# Patient Record
Sex: MALE | Race: BLACK OR AFRICAN AMERICAN
[De-identification: names, ages, dates, MRNs, and addresses within clinical notes are randomized per-mention and may not be internally consistent; named-entity substitution may affect disease eponyms.]

---

## 2017-03-30 ENCOUNTER — HOSPITAL ENCOUNTER (OUTPATIENT)
Dept: HOSPITAL 35 - PAIN | Age: 67
End: 2017-03-30
Attending: ANESTHESIOLOGY
Payer: COMMERCIAL

## 2017-03-30 VITALS — SYSTOLIC BLOOD PRESSURE: 158 MMHG | DIASTOLIC BLOOD PRESSURE: 76 MMHG

## 2017-03-30 VITALS — BODY MASS INDEX: 40.97 KG/M2 | WEIGHT: 240 LBS | HEIGHT: 64.02 IN

## 2017-03-30 DIAGNOSIS — E66.01: ICD-10-CM

## 2017-03-30 DIAGNOSIS — F32.9: ICD-10-CM

## 2017-03-30 DIAGNOSIS — M54.12: Primary | ICD-10-CM

## 2017-03-30 DIAGNOSIS — F17.210: ICD-10-CM

## 2017-03-30 DIAGNOSIS — I10: ICD-10-CM

## 2017-10-06 ENCOUNTER — HOSPITAL ENCOUNTER (OUTPATIENT)
Dept: HOSPITAL 35 - PAIN | Age: 67
Discharge: HOME | End: 2017-10-06
Attending: ANESTHESIOLOGY
Payer: COMMERCIAL

## 2017-10-06 VITALS — BODY MASS INDEX: 38.41 KG/M2 | WEIGHT: 225 LBS | HEIGHT: 64.02 IN

## 2017-10-06 VITALS — DIASTOLIC BLOOD PRESSURE: 84 MMHG | SYSTOLIC BLOOD PRESSURE: 132 MMHG

## 2017-10-06 DIAGNOSIS — M54.16: Primary | ICD-10-CM

## 2017-10-06 DIAGNOSIS — M54.12: ICD-10-CM

## 2017-10-06 DIAGNOSIS — F17.200: ICD-10-CM

## 2017-10-27 ENCOUNTER — HOSPITAL ENCOUNTER (OUTPATIENT)
Dept: HOSPITAL 35 - PAIN | Age: 67
Discharge: HOME | End: 2017-10-27
Attending: ANESTHESIOLOGY
Payer: COMMERCIAL

## 2017-10-27 VITALS — WEIGHT: 225 LBS | HEIGHT: 64.02 IN | BODY MASS INDEX: 38.41 KG/M2

## 2017-10-27 VITALS — SYSTOLIC BLOOD PRESSURE: 143 MMHG | DIASTOLIC BLOOD PRESSURE: 88 MMHG

## 2017-10-27 DIAGNOSIS — Z79.891: ICD-10-CM

## 2017-10-27 DIAGNOSIS — M54.12: Primary | ICD-10-CM

## 2017-10-27 DIAGNOSIS — M54.16: ICD-10-CM

## 2017-12-13 ENCOUNTER — HOSPITAL ENCOUNTER (OUTPATIENT)
Dept: HOSPITAL 35 - PAIN | Age: 67
Discharge: HOME | End: 2017-12-13
Payer: COMMERCIAL

## 2017-12-13 VITALS — WEIGHT: 228 LBS | BODY MASS INDEX: 38.93 KG/M2 | HEIGHT: 64.02 IN

## 2017-12-13 VITALS — SYSTOLIC BLOOD PRESSURE: 123 MMHG | DIASTOLIC BLOOD PRESSURE: 73 MMHG

## 2017-12-13 DIAGNOSIS — F17.210: ICD-10-CM

## 2017-12-13 DIAGNOSIS — Z79.899: ICD-10-CM

## 2017-12-13 DIAGNOSIS — Z79.891: ICD-10-CM

## 2017-12-13 DIAGNOSIS — M54.16: Primary | ICD-10-CM

## 2017-12-13 DIAGNOSIS — Z98.890: ICD-10-CM

## 2017-12-13 DIAGNOSIS — M54.12: ICD-10-CM

## 2018-02-01 ENCOUNTER — HOSPITAL ENCOUNTER (OUTPATIENT)
Dept: HOSPITAL 35 - PAIN | Age: 68
End: 2018-02-01
Attending: ANESTHESIOLOGY
Payer: COMMERCIAL

## 2018-02-01 VITALS — WEIGHT: 231.8 LBS | HEIGHT: 64.02 IN | BODY MASS INDEX: 39.57 KG/M2

## 2018-02-01 VITALS — DIASTOLIC BLOOD PRESSURE: 85 MMHG | SYSTOLIC BLOOD PRESSURE: 127 MMHG

## 2018-02-01 DIAGNOSIS — Z79.899: ICD-10-CM

## 2018-02-01 DIAGNOSIS — M54.16: Primary | ICD-10-CM

## 2018-02-01 DIAGNOSIS — M54.12: ICD-10-CM

## 2018-03-16 ENCOUNTER — HOSPITAL ENCOUNTER (OUTPATIENT)
Dept: HOSPITAL 35 - PAIN | Age: 68
End: 2018-03-16
Payer: COMMERCIAL

## 2018-03-16 VITALS — SYSTOLIC BLOOD PRESSURE: 139 MMHG | DIASTOLIC BLOOD PRESSURE: 85 MMHG

## 2018-03-16 VITALS — WEIGHT: 234.2 LBS | HEIGHT: 64.02 IN | BODY MASS INDEX: 39.98 KG/M2

## 2018-03-16 DIAGNOSIS — I10: ICD-10-CM

## 2018-03-16 DIAGNOSIS — Z79.899: ICD-10-CM

## 2018-03-16 DIAGNOSIS — F17.210: ICD-10-CM

## 2018-03-16 DIAGNOSIS — G89.29: ICD-10-CM

## 2018-03-16 DIAGNOSIS — Z79.891: ICD-10-CM

## 2018-03-16 DIAGNOSIS — M54.16: Primary | ICD-10-CM

## 2018-03-16 DIAGNOSIS — M19.90: ICD-10-CM

## 2018-04-02 ENCOUNTER — HOSPITAL ENCOUNTER (OUTPATIENT)
Dept: HOSPITAL 35 - PAIN | Age: 68
End: 2018-04-02
Attending: ANESTHESIOLOGY
Payer: COMMERCIAL

## 2018-04-02 VITALS — HEIGHT: 64.02 IN | BODY MASS INDEX: 40.26 KG/M2 | WEIGHT: 235.8 LBS

## 2018-04-02 VITALS — DIASTOLIC BLOOD PRESSURE: 90 MMHG | SYSTOLIC BLOOD PRESSURE: 129 MMHG

## 2018-04-02 DIAGNOSIS — M54.12: ICD-10-CM

## 2018-04-02 DIAGNOSIS — G47.30: ICD-10-CM

## 2018-04-02 DIAGNOSIS — M54.16: Primary | ICD-10-CM

## 2018-06-11 ENCOUNTER — HOSPITAL ENCOUNTER (OUTPATIENT)
Dept: HOSPITAL 35 - PAIN | Age: 68
End: 2018-06-11
Attending: ANESTHESIOLOGY
Payer: COMMERCIAL

## 2018-06-11 VITALS — BODY MASS INDEX: 39.78 KG/M2 | WEIGHT: 233 LBS | HEIGHT: 64.02 IN

## 2018-06-11 VITALS — SYSTOLIC BLOOD PRESSURE: 152 MMHG | DIASTOLIC BLOOD PRESSURE: 85 MMHG

## 2018-06-11 DIAGNOSIS — F17.200: ICD-10-CM

## 2018-06-11 DIAGNOSIS — M54.5: ICD-10-CM

## 2018-06-11 DIAGNOSIS — Z79.899: ICD-10-CM

## 2018-06-11 DIAGNOSIS — F11.90: ICD-10-CM

## 2018-06-11 DIAGNOSIS — M54.2: ICD-10-CM

## 2018-06-11 DIAGNOSIS — G89.29: Primary | ICD-10-CM

## 2018-06-20 ENCOUNTER — HOSPITAL ENCOUNTER (OUTPATIENT)
Dept: HOSPITAL 35 - RAD | Age: 68
End: 2018-06-20
Attending: INTERNAL MEDICINE
Payer: COMMERCIAL

## 2018-06-20 DIAGNOSIS — J44.9: Primary | ICD-10-CM

## 2018-11-16 ENCOUNTER — HOSPITAL ENCOUNTER (OUTPATIENT)
Dept: HOSPITAL 35 - PAIN | Age: 68
Discharge: HOME | End: 2018-11-16
Payer: COMMERCIAL

## 2018-11-16 VITALS — BODY MASS INDEX: 43.77 KG/M2 | WEIGHT: 256.4 LBS | HEIGHT: 64.02 IN

## 2018-11-16 VITALS — SYSTOLIC BLOOD PRESSURE: 144 MMHG | DIASTOLIC BLOOD PRESSURE: 82 MMHG

## 2018-11-16 DIAGNOSIS — M54.5: Primary | ICD-10-CM

## 2018-11-16 DIAGNOSIS — Z79.899: ICD-10-CM

## 2018-11-16 DIAGNOSIS — F17.210: ICD-10-CM

## 2019-03-18 ENCOUNTER — HOSPITAL ENCOUNTER (INPATIENT)
Dept: HOSPITAL 35 - ER | Age: 69
LOS: 1 days | Discharge: HOME | DRG: 191 | End: 2019-03-19
Attending: INTERNAL MEDICINE | Admitting: INTERNAL MEDICINE
Payer: COMMERCIAL

## 2019-03-18 VITALS — SYSTOLIC BLOOD PRESSURE: 152 MMHG | DIASTOLIC BLOOD PRESSURE: 82 MMHG

## 2019-03-18 VITALS
DIASTOLIC BLOOD PRESSURE: 78 MMHG | SYSTOLIC BLOOD PRESSURE: 126 MMHG | BODY MASS INDEX: 40.82 KG/M2 | WEIGHT: 245 LBS | HEIGHT: 65 IN

## 2019-03-18 VITALS — DIASTOLIC BLOOD PRESSURE: 80 MMHG | SYSTOLIC BLOOD PRESSURE: 119 MMHG

## 2019-03-18 VITALS — SYSTOLIC BLOOD PRESSURE: 139 MMHG | DIASTOLIC BLOOD PRESSURE: 95 MMHG

## 2019-03-18 VITALS — DIASTOLIC BLOOD PRESSURE: 77 MMHG | SYSTOLIC BLOOD PRESSURE: 137 MMHG

## 2019-03-18 DIAGNOSIS — Z79.899: ICD-10-CM

## 2019-03-18 DIAGNOSIS — Z83.6: ICD-10-CM

## 2019-03-18 DIAGNOSIS — M06.9: ICD-10-CM

## 2019-03-18 DIAGNOSIS — J45.909: ICD-10-CM

## 2019-03-18 DIAGNOSIS — Z86.19: ICD-10-CM

## 2019-03-18 DIAGNOSIS — I10: ICD-10-CM

## 2019-03-18 DIAGNOSIS — J44.1: Primary | ICD-10-CM

## 2019-03-18 DIAGNOSIS — Z79.1: ICD-10-CM

## 2019-03-18 DIAGNOSIS — E66.01: ICD-10-CM

## 2019-03-18 DIAGNOSIS — Z90.49: ICD-10-CM

## 2019-03-18 DIAGNOSIS — M19.90: ICD-10-CM

## 2019-03-18 DIAGNOSIS — F17.210: ICD-10-CM

## 2019-03-18 DIAGNOSIS — G47.33: ICD-10-CM

## 2019-03-18 DIAGNOSIS — F12.90: ICD-10-CM

## 2019-03-18 LAB
ANION GAP SERPL CALC-SCNC: 10 MMOL/L (ref 7–16)
BASOPHILS NFR BLD AUTO: 3 % (ref 0–2)
BE(VIVO): -0.7 MMOL/L
BUN SERPL-MCNC: 13 MG/DL (ref 7–18)
CALCIUM SERPL-MCNC: 8.9 MG/DL (ref 8.5–10.1)
CHLORIDE SERPL-SCNC: 103 MMOL/L (ref 98–107)
CO2 SERPL-SCNC: 26 MMOL/L (ref 21–32)
CREAT SERPL-MCNC: 1.1 MG/DL (ref 0.7–1.3)
EOSINOPHIL NFR BLD: 2 % (ref 0–3)
ERYTHROCYTE [DISTWIDTH] IN BLOOD BY AUTOMATED COUNT: 13.1 % (ref 10.5–14.5)
GLUCOSE SERPL-MCNC: 103 MG/DL (ref 74–106)
GRANULOCYTES NFR BLD MANUAL: 53 % (ref 36–66)
HCO3 BLD-SCNC: 24.1 MMOL/L (ref 22–26)
HCT VFR BLD CALC: 41.4 % (ref 42–52)
HGB BLD-MCNC: 13.7 GM/DL (ref 14–18)
LYMPHOCYTES NFR BLD AUTO: 29 % (ref 24–44)
MCH RBC QN AUTO: 31.1 PG (ref 26–34)
MCHC RBC AUTO-ENTMCNC: 33 G/DL (ref 28–37)
MCV RBC: 94.3 FL (ref 80–100)
MONOCYTES NFR BLD: 13 % (ref 1–8)
NEUTROPHILS # BLD: 2.2 THOU/UL (ref 1.4–8.2)
PCO2 BLD: 40.1 MMHG (ref 35–45)
PLATELET # BLD: 152 THOU/UL (ref 150–400)
PO2 BLD: 67.9 MMHG (ref 80–100)
POTASSIUM SERPL-SCNC: 3.9 MMOL/L (ref 3.5–5.1)
RBC # BLD AUTO: 4.39 MIL/UL (ref 4.5–6)
RBC MORPH BLD: NORMAL
SODIUM SERPL-SCNC: 139 MMOL/L (ref 136–145)
TROPONIN I SERPL-MCNC: 0.07 NG/ML (ref ?–0.06)
WBC # BLD AUTO: 4.1 THOU/UL (ref 4–11)

## 2019-03-18 PROCEDURE — 10081 I&D PILONIDAL CYST COMP: CPT

## 2019-03-18 NOTE — NUR
PT ORIENTED TO ROOM AND UNIT. BED LOW AND LOCKED, SIDE RAILS UPX 3, CALL LIGHT
IN REACH. WILL CONTINUE TO ASSESS.

## 2019-03-19 VITALS — SYSTOLIC BLOOD PRESSURE: 148 MMHG | DIASTOLIC BLOOD PRESSURE: 92 MMHG

## 2019-03-19 VITALS — DIASTOLIC BLOOD PRESSURE: 92 MMHG | SYSTOLIC BLOOD PRESSURE: 148 MMHG

## 2019-03-19 VITALS — SYSTOLIC BLOOD PRESSURE: 136 MMHG | DIASTOLIC BLOOD PRESSURE: 74 MMHG

## 2019-03-19 LAB
ANION GAP SERPL CALC-SCNC: 10 MMOL/L (ref 7–16)
BUN SERPL-MCNC: 14 MG/DL (ref 7–18)
CALCIUM SERPL-MCNC: 8.7 MG/DL (ref 8.5–10.1)
CHLORIDE SERPL-SCNC: 102 MMOL/L (ref 98–107)
CHOLEST SERPL-MCNC: 124 MG/DL (ref ?–200)
CO2 SERPL-SCNC: 25 MMOL/L (ref 21–32)
CREAT SERPL-MCNC: 1.1 MG/DL (ref 0.7–1.3)
ERYTHROCYTE [DISTWIDTH] IN BLOOD BY AUTOMATED COUNT: 13.3 % (ref 10.5–14.5)
GLUCOSE SERPL-MCNC: 166 MG/DL (ref 74–106)
HCT VFR BLD CALC: 42.6 % (ref 42–52)
HDLC SERPL-MCNC: 67 MG/DL (ref 40–?)
HGB BLD-MCNC: 13.9 GM/DL (ref 14–18)
LDLC SERPL-MCNC: 50 MG/DL (ref ?–100)
MCH RBC QN AUTO: 30.8 PG (ref 26–34)
MCHC RBC AUTO-ENTMCNC: 32.7 G/DL (ref 28–37)
MCV RBC: 94.2 FL (ref 80–100)
PLATELET # BLD: 165 THOU/UL (ref 150–400)
POTASSIUM SERPL-SCNC: 4.4 MMOL/L (ref 3.5–5.1)
RBC # BLD AUTO: 4.52 MIL/UL (ref 4.5–6)
SODIUM SERPL-SCNC: 137 MMOL/L (ref 136–145)
TC:HDL: 1.9 RATIO
TRIGL SERPL-MCNC: 37 MG/DL (ref ?–150)
TROPONIN I SERPL-MCNC: 0.06 NG/ML (ref ?–0.06)
VLDLC SERPL CALC-MCNC: 7 MG/DL (ref ?–40)
WBC # BLD AUTO: 4.7 THOU/UL (ref 4–11)

## 2019-03-19 PROCEDURE — 5A09357 ASSISTANCE WITH RESPIRATORY VENTILATION, LESS THAN 24 CONSECUTIVE HOURS, CONTINUOUS POSITIVE AIRWAY PRESSURE: ICD-10-PCS | Performed by: INTERNAL MEDICINE

## 2019-03-19 NOTE — NUR
ASSUMED PT CARE AT 1900 WITH NO SIGN OF DISTRESS NOTED IN PT. PT IS ALERT AND
ORIENTED AND IS ON 2L NC. PT HAS CPAP FOR BEDTIME. SCHEDULED MEDS ADMINISTERED
TO PT. PRN BREATHING TREATMENT ADMINISTERED AS NEEDED. VITAL SIGNS STABLE. NO
SIGN OF DISTRESS NOTED, DENIES ANY FURTHER NEEDS AT THIS TIME.

## 2019-03-29 ENCOUNTER — HOSPITAL ENCOUNTER (OUTPATIENT)
Dept: HOSPITAL 35 - PAIN | Age: 69
Discharge: HOME | End: 2019-03-29
Payer: COMMERCIAL

## 2019-03-29 VITALS — BODY MASS INDEX: 43.02 KG/M2 | HEIGHT: 64.02 IN | WEIGHT: 252 LBS

## 2019-03-29 VITALS — DIASTOLIC BLOOD PRESSURE: 88 MMHG | SYSTOLIC BLOOD PRESSURE: 151 MMHG

## 2019-03-29 DIAGNOSIS — J44.9: ICD-10-CM

## 2019-03-29 DIAGNOSIS — I10: ICD-10-CM

## 2019-03-29 DIAGNOSIS — Z86.19: ICD-10-CM

## 2019-03-29 DIAGNOSIS — Z88.8: ICD-10-CM

## 2019-03-29 DIAGNOSIS — Z79.891: ICD-10-CM

## 2019-03-29 DIAGNOSIS — G89.29: ICD-10-CM

## 2019-03-29 DIAGNOSIS — K21.9: ICD-10-CM

## 2019-03-29 DIAGNOSIS — M54.12: ICD-10-CM

## 2019-03-29 DIAGNOSIS — Z79.899: ICD-10-CM

## 2019-03-29 DIAGNOSIS — Z98.890: ICD-10-CM

## 2019-03-29 DIAGNOSIS — M54.16: Primary | ICD-10-CM

## 2019-03-29 NOTE — NUR
Pain Clinic Assessment:
 
1. History of Osteoarthritis:
NO
   History of Rheumatoid Arthritis:
NO
 
2. Height: 5 ft. 4 in. 162.6 cm.
   Weight: 252.0 lb.  oz. 114.307 kg.
   Patient's BMI: 43.2
 
3. Vital Signs:
   BP: 151/88 Pulse: 103 Resp: 18
   Temp:  02 Sat: 96 ECG Mon:
 
4. Pain Intensity: 6
 
5. Fall Risk:
   Dizziness: N  Needs help standing or walking: N
   Fallen in the last 3 months: N
   Fall risk comments:
FELL 2 MONTHS AGO  DID NOT SEEK MEDICAL
 
6. Patient on Blood Thinner: None
 
7. History of Hypertension: Y
 
8. Opioid Therapy greater than 6 weeks: Y
   Opiate Contract Signed: 10/27/17
 
9. Risk Assessment Tool Provided: LOW-0
 
10. Functional Assessment Tool: 38/70
 
11. Recreational Drug Use: Never Drug Type:
    Tobacco Use: Never Smoker Tobacco Type:
       Amount or Packs/day:  How Many Years:
    Alcohol Use: Yes  Frequency:  Quant:

## 2019-04-24 ENCOUNTER — HOSPITAL ENCOUNTER (OUTPATIENT)
Dept: HOSPITAL 35 - PAIN | Age: 69
End: 2019-04-24
Payer: COMMERCIAL

## 2019-04-24 ENCOUNTER — HOSPITAL ENCOUNTER (EMERGENCY)
Dept: HOSPITAL 35 - ER | Age: 69
Discharge: HOME | End: 2019-04-24
Payer: COMMERCIAL

## 2019-04-24 ENCOUNTER — HOSPITAL ENCOUNTER (OUTPATIENT)
Dept: HOSPITAL 35 - RAD | Age: 69
End: 2019-04-24
Attending: INTERNAL MEDICINE
Payer: COMMERCIAL

## 2019-04-24 VITALS — SYSTOLIC BLOOD PRESSURE: 144 MMHG | DIASTOLIC BLOOD PRESSURE: 82 MMHG

## 2019-04-24 VITALS — WEIGHT: 241.01 LBS | BODY MASS INDEX: 40.15 KG/M2 | HEIGHT: 65 IN

## 2019-04-24 VITALS — SYSTOLIC BLOOD PRESSURE: 149 MMHG | DIASTOLIC BLOOD PRESSURE: 67 MMHG

## 2019-04-24 VITALS — SYSTOLIC BLOOD PRESSURE: 117 MMHG | DIASTOLIC BLOOD PRESSURE: 99 MMHG

## 2019-04-24 VITALS — BODY MASS INDEX: 41.11 KG/M2 | WEIGHT: 240.8 LBS | HEIGHT: 64.02 IN

## 2019-04-24 DIAGNOSIS — I10: ICD-10-CM

## 2019-04-24 DIAGNOSIS — M54.5: ICD-10-CM

## 2019-04-24 DIAGNOSIS — R06.02: Primary | ICD-10-CM

## 2019-04-24 DIAGNOSIS — J44.9: ICD-10-CM

## 2019-04-24 DIAGNOSIS — G89.29: Primary | ICD-10-CM

## 2019-04-24 DIAGNOSIS — Z86.19: ICD-10-CM

## 2019-04-24 DIAGNOSIS — K21.0: Primary | ICD-10-CM

## 2019-04-24 DIAGNOSIS — Z88.8: ICD-10-CM

## 2019-04-24 DIAGNOSIS — F17.210: ICD-10-CM

## 2019-04-24 DIAGNOSIS — Z88.6: ICD-10-CM

## 2019-04-24 DIAGNOSIS — Z79.891: ICD-10-CM

## 2019-04-24 DIAGNOSIS — M25.552: ICD-10-CM

## 2019-04-24 LAB
ALBUMIN SERPL-MCNC: 3.8 G/DL (ref 3.4–5)
ALT SERPL-CCNC: 54 U/L (ref 30–65)
ANION GAP SERPL CALC-SCNC: 8 MMOL/L (ref 7–16)
AST SERPL-CCNC: 35 U/L (ref 15–37)
BASOPHILS NFR BLD AUTO: 0 % (ref 0–2)
BILIRUB SERPL-MCNC: 0.8 MG/DL
BUN SERPL-MCNC: 7 MG/DL (ref 7–18)
CALCIUM SERPL-MCNC: 9.5 MG/DL (ref 8.5–10.1)
CHLORIDE SERPL-SCNC: 102 MMOL/L (ref 98–107)
CO2 SERPL-SCNC: 28 MMOL/L (ref 21–32)
CREAT SERPL-MCNC: 1 MG/DL (ref 0.7–1.3)
EOSINOPHIL NFR BLD: 0 % (ref 0–3)
ERYTHROCYTE [DISTWIDTH] IN BLOOD BY AUTOMATED COUNT: 14.3 % (ref 10.5–14.5)
GLUCOSE SERPL-MCNC: 95 MG/DL (ref 74–106)
GRANULOCYTES NFR BLD MANUAL: 68 % (ref 36–66)
HCT VFR BLD CALC: 43.9 % (ref 42–52)
HGB BLD-MCNC: 14.4 GM/DL (ref 14–18)
LYMPHOCYTES NFR BLD AUTO: 21 % (ref 24–44)
MAGNESIUM SERPL-MCNC: 1.9 MG/DL (ref 1.8–2.4)
MCH RBC QN AUTO: 31.6 PG (ref 26–34)
MCHC RBC AUTO-ENTMCNC: 32.9 G/DL (ref 28–37)
MCV RBC: 96.2 FL (ref 80–100)
MONOCYTES NFR BLD: 11 % (ref 1–8)
NEUTROPHILS # BLD: 2.9 THOU/UL (ref 1.4–8.2)
PLATELET # BLD EST: NORMAL 10*3/UL
PLATELET # BLD: 150 THOU/UL (ref 150–400)
POTASSIUM SERPL-SCNC: 3.9 MMOL/L (ref 3.5–5.1)
PROT SERPL-MCNC: 7.5 G/DL (ref 6.4–8.2)
RBC # BLD AUTO: 4.57 MIL/UL (ref 4.5–6)
RBC MORPH BLD: NORMAL
SODIUM SERPL-SCNC: 138 MMOL/L (ref 136–145)
TROPONIN I SERPL-MCNC: <0.06 NG/ML (ref ?–0.06)
WBC # BLD AUTO: 4.2 THOU/UL (ref 4–11)

## 2019-04-24 NOTE — NUR
Pain Clinic Assessment:
 
1. History of Osteoarthritis:
NO
   History of Rheumatoid Arthritis:
NO
 
2. Height: 5 ft. 4 in. 162.6 cm.
   Weight: 240.8 lb.  oz. 109.226 kg.
   Patient's BMI: 41.3
 
3. Vital Signs:
   BP: 144/82 Pulse: 51 Resp: 16
   Temp:  02 Sat: 100 ECG Mon:
 
4. Pain Intensity: 6
 
5. Fall Risk:
   Dizziness: Y  Needs help standing or walking: N
   Fallen in the last 3 months: N
   Fall risk comments:
FELL 2 MONTHS AGO  DID NOT SEEK MEDICAL
 
6. Patient on Blood Thinner: None
 
7. History of Hypertension: Y
 
8. Opioid Therapy greater than 6 weeks: Y
   Opiate Contract Signed: 10/27/17
 
9. Risk Assessment Tool Provided: LOW-0
 
10. Functional Assessment Tool: 38/70
 
11. Recreational Drug Use: Never Drug Type:
    Tobacco Use: Current Every Day Smoker Tobacco Type: Cigarettes
       Amount or Packs/day: 9-11 CIGS How Many Years:
    Alcohol Use: Yes  Frequency: Daily Quant: 2

## 2019-05-01 ENCOUNTER — HOSPITAL ENCOUNTER (OUTPATIENT)
Dept: HOSPITAL 35 - PAIN | Age: 69
Discharge: HOME | End: 2019-05-01
Payer: COMMERCIAL

## 2019-05-01 VITALS — SYSTOLIC BLOOD PRESSURE: 144 MMHG | DIASTOLIC BLOOD PRESSURE: 91 MMHG

## 2019-05-01 VITALS — BODY MASS INDEX: 40.12 KG/M2 | WEIGHT: 240.8 LBS | HEIGHT: 65 IN

## 2019-05-01 DIAGNOSIS — Z79.899: ICD-10-CM

## 2019-05-01 DIAGNOSIS — J44.9: ICD-10-CM

## 2019-05-01 DIAGNOSIS — M54.16: Primary | ICD-10-CM

## 2019-05-01 DIAGNOSIS — M54.12: ICD-10-CM

## 2019-05-01 DIAGNOSIS — I10: ICD-10-CM

## 2019-05-01 DIAGNOSIS — K21.0: ICD-10-CM

## 2019-05-01 DIAGNOSIS — Z86.19: ICD-10-CM

## 2019-05-01 DIAGNOSIS — F17.210: ICD-10-CM

## 2019-05-01 DIAGNOSIS — G89.29: ICD-10-CM

## 2019-05-01 DIAGNOSIS — Z98.890: ICD-10-CM

## 2019-05-01 DIAGNOSIS — Z88.6: ICD-10-CM

## 2019-05-01 NOTE — NUR
Pain Clinic Assessment:
 
1. History of Osteoarthritis:
NO
   History of Rheumatoid Arthritis:
NO
 
2. Height: 5 ft. 5 in. 165.1 cm.
   Weight: 240.8 lb.  oz. 109.226 kg.
   Patient's BMI: 40.1
 
3. Vital Signs:
   BP: 144/91 Pulse: 90 Resp: 18
   Temp:  02 Sat: 97 ECG Mon:
 
4. Pain Intensity: 4-5
 
5. Fall Risk:
   Dizziness: N  Needs help standing or walking: N
   Fallen in the last 3 months: N
   Fall risk comments:
FELL 2 MONTHS AGO  DID NOT SEEK MEDICAL
 
6. Patient on Blood Thinner: None
 
7. History of Hypertension: Y
 
8. Opioid Therapy greater than 6 weeks: Y
   Opiate Contract Signed: 10/27/17
 
9. Risk Assessment Tool Provided: LOW-0
 
10. Functional Assessment Tool: 38/70
 
11. Recreational Drug Use: Current within past 3 mos Drug Type: MARIJUANA
    Tobacco Use: Current Every Day Smoker Tobacco Type: Cigarettes
       Amount or Packs/day: 10 CIGS How Many Years: 55
    Alcohol Use: Yes  Frequency: Daily Quant: 1-2

## 2019-06-13 ENCOUNTER — HOSPITAL ENCOUNTER (OUTPATIENT)
Dept: HOSPITAL 35 - RAD | Age: 69
End: 2019-06-13
Attending: INTERNAL MEDICINE
Payer: COMMERCIAL

## 2019-06-13 DIAGNOSIS — J98.4: Primary | ICD-10-CM

## 2019-07-24 ENCOUNTER — HOSPITAL ENCOUNTER (OUTPATIENT)
Dept: HOSPITAL 35 - PAIN | Age: 69
Discharge: HOME | End: 2019-07-24
Payer: COMMERCIAL

## 2019-07-24 VITALS — BODY MASS INDEX: 39.98 KG/M2 | WEIGHT: 234.2 LBS | HEIGHT: 64.02 IN

## 2019-07-24 VITALS — SYSTOLIC BLOOD PRESSURE: 152 MMHG | DIASTOLIC BLOOD PRESSURE: 92 MMHG

## 2019-07-24 DIAGNOSIS — Z98.890: ICD-10-CM

## 2019-07-24 DIAGNOSIS — Z79.899: ICD-10-CM

## 2019-07-24 DIAGNOSIS — M54.12: ICD-10-CM

## 2019-07-24 DIAGNOSIS — F17.210: ICD-10-CM

## 2019-07-24 DIAGNOSIS — I10: ICD-10-CM

## 2019-07-24 DIAGNOSIS — M54.16: Primary | ICD-10-CM

## 2019-07-24 DIAGNOSIS — J44.9: ICD-10-CM

## 2019-07-24 DIAGNOSIS — Z88.8: ICD-10-CM

## 2019-07-24 DIAGNOSIS — R42: ICD-10-CM

## 2019-07-24 NOTE — NUR
Pain Clinic Assessment:
 
1. History of Osteoarthritis:
NO
   History of Rheumatoid Arthritis:
NO
 
2. Height: 5 ft. 4 in. 162.6 cm.
   Weight: 234.2 lb.  oz. 106.233 kg.
   Patient's BMI: 40.2
 
3. Vital Signs:
   BP: 152/92 Pulse: 52 Resp: 16
   Temp:  02 Sat: 98 ECG Mon:
 
4. Pain Intensity: 7-8
 
5. Fall Risk:
   Dizziness: N  Needs help standing or walking: N
   Fallen in the last 3 months: N
   Fall risk comments:
FELL 2 MONTHS AGO  DID NOT SEEK MEDICAL
 
6. Patient on Blood Thinner: None
 
7. History of Hypertension: Y
 
8. Opioid Therapy greater than 6 weeks: Y
   Opiate Contract Signed: 10/27/17
 
9. Risk Assessment Tool Provided: LOW-0
 
10. Functional Assessment Tool: 38/70
 
11. Recreational Drug Use: Never Drug Type:
    Tobacco Use: Current Every Day Smoker Tobacco Type: Cigarettes
       Amount or Packs/day: 1 PACK How Many Years:
    Alcohol Use: Yes  Frequency: Daily Quant: 2

## 2019-09-19 ENCOUNTER — HOSPITAL ENCOUNTER (OUTPATIENT)
Dept: HOSPITAL 35 - RAD | Age: 69
End: 2019-09-19
Attending: INTERNAL MEDICINE
Payer: COMMERCIAL

## 2019-09-19 DIAGNOSIS — J44.1: Primary | ICD-10-CM

## 2019-09-19 DIAGNOSIS — Z88.8: ICD-10-CM

## 2019-09-19 DIAGNOSIS — J98.4: ICD-10-CM

## 2019-10-02 ENCOUNTER — HOSPITAL ENCOUNTER (OUTPATIENT)
Dept: HOSPITAL 35 - PAIN | Age: 69
Discharge: HOME | End: 2019-10-02
Payer: COMMERCIAL

## 2019-10-02 VITALS — BODY MASS INDEX: 38.59 KG/M2 | WEIGHT: 231.6 LBS | HEIGHT: 65 IN

## 2019-10-02 VITALS — SYSTOLIC BLOOD PRESSURE: 149 MMHG | DIASTOLIC BLOOD PRESSURE: 91 MMHG

## 2019-10-02 DIAGNOSIS — Z79.899: ICD-10-CM

## 2019-10-02 DIAGNOSIS — I10: ICD-10-CM

## 2019-10-02 DIAGNOSIS — M54.12: ICD-10-CM

## 2019-10-02 DIAGNOSIS — M54.16: Primary | ICD-10-CM

## 2019-10-02 DIAGNOSIS — J44.9: ICD-10-CM

## 2019-10-02 DIAGNOSIS — F17.210: ICD-10-CM

## 2019-10-02 DIAGNOSIS — Z88.8: ICD-10-CM

## 2019-10-02 NOTE — NUR
Pain Clinic Assessment:
 
1. History of Osteoarthritis:
NO
   History of Rheumatoid Arthritis:
NO
 
2. Height: 5 ft. 5 in. 165.1 cm.
   Weight: 231.6 lb.  oz. 105.053 kg.
   Patient's BMI: 38.5
 
3. Vital Signs:
   BP: 149/91 Pulse: 95 Resp: 20
   Temp:  02 Sat: 98 ECG Mon:
 
4. Pain Intensity: 7-8
 
5. Fall Risk:
   Dizziness: N  Needs help standing or walking: N
   Fallen in the last 3 months: N
   Fall risk comments:
FELL 2 MONTHS AGO  DID NOT SEEK MEDICAL
 
6. Patient on Blood Thinner: None
 
7. History of Hypertension: Y
 
8. Opioid Therapy greater than 6 weeks: Y
   Opiate Contract Signed: 10/27/17
 
9. Risk Assessment Tool Provided: LOW-0
 
10. Functional Assessment Tool: 38/70
 
11. Recreational Drug Use: Never Drug Type:
    Tobacco Use: Current Every Day Smoker Tobacco Type:
       Amount or Packs/day:  How Many Years:
    Alcohol Use: Yes  Frequency:  Quant:
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
## 2019-10-25 ENCOUNTER — HOSPITAL ENCOUNTER (OUTPATIENT)
Dept: HOSPITAL 35 - PAIN | Age: 69
End: 2019-10-25
Payer: COMMERCIAL

## 2019-10-25 ENCOUNTER — HOSPITAL ENCOUNTER (EMERGENCY)
Dept: HOSPITAL 35 - ER | Age: 69
Discharge: LEFT BEFORE DISCHARGE | End: 2019-10-25
Payer: COMMERCIAL

## 2019-10-25 VITALS — SYSTOLIC BLOOD PRESSURE: 151 MMHG | DIASTOLIC BLOOD PRESSURE: 78 MMHG

## 2019-10-25 VITALS — SYSTOLIC BLOOD PRESSURE: 145 MMHG | DIASTOLIC BLOOD PRESSURE: 92 MMHG

## 2019-10-25 VITALS — WEIGHT: 231 LBS | BODY MASS INDEX: 38.49 KG/M2 | HEIGHT: 65 IN

## 2019-10-25 DIAGNOSIS — M54.16: ICD-10-CM

## 2019-10-25 DIAGNOSIS — Z87.891: ICD-10-CM

## 2019-10-25 DIAGNOSIS — Z53.21: Primary | ICD-10-CM

## 2019-10-25 DIAGNOSIS — R42: ICD-10-CM

## 2019-10-25 DIAGNOSIS — M54.12: Primary | ICD-10-CM

## 2019-10-25 DIAGNOSIS — J44.9: ICD-10-CM

## 2019-10-25 DIAGNOSIS — I10: ICD-10-CM

## 2019-10-25 NOTE — NUR
Pain Clinic Assessment:
 
1. History of Osteoarthritis:
NO
   History of Rheumatoid Arthritis:
NO
 
2. Height: 5 ft. 5 in. 165.1 cm.
   Weight: 231.0 lb.  oz. 104.781 kg.
   Patient's BMI: 38.4
 
3. Vital Signs:
   BP: 151/78 Pulse: 81 Resp: 16
   Temp:  02 Sat: 98 ECG Mon:
 
4. Pain Intensity: 7-8
 
5. Fall Risk:
   Dizziness: Y  Needs help standing or walking: N
   Fallen in the last 3 months: N
   Fall risk comments:
FELL 2 MONTHS AGO  DID NOT SEEK MEDICAL
 
6. Patient on Blood Thinner: None
 
7. History of Hypertension: Y
 
8. Opioid Therapy greater than 6 weeks: Y
   Opiate Contract Signed: 10/27/17
 
9. Risk Assessment Tool Provided: LOW-0
 
10. Functional Assessment Tool: 38/70
 
11. Recreational Drug Use: Never Drug Type:
    Tobacco Use: Current Every Day Smoker Tobacco Type:
       Amount or Packs/day:  How Many Years:
    Alcohol Use: Yes  Frequency:  Quant:

## 2019-10-26 NOTE — EKG
[place] [address] [facility]
[place]  [address]
Phone:  [phone]                    ELECTROCARDIOGRAM REPORT      
_______________________________________________________________________________
 
Name:       ENEDELIA JUAREZ            Room #:                     [facility]#:      [id number]     Account #:      [id number]  
Admission:  10/25/19    Attend Phys:                          
Discharge:  10/25/19    Date of Birth:  [birth year]  
                                                          Report #: [id number]
   [id number]
_______________________________________________________________________________
THIS REPORT FOR:   //name//                          
 
                         [facility] ED
                                       
Test Date:    2019-10-25               Test Time:    12:54:13
Pat Name:     ENEDELIA JUAREZ           Department:   
Patient ID:   [id number]            Room:          
Gender:       M                        Technician:   WG
:          1950               Requested By: Ning Prather
Order Number: [id number] MD:   Curt Bruno
                                 Measurements
Intervals                              Axis          
Rate:         91                       P:            71
OK:           135                      QRS:          6
QRSD:         87                       T:            28
QT:           349                                    
QTc:          430                                    
                           Interpretive Statements
Sinus rhythm
Ventricular premature complex
Compared to ECG 2019 14:57:15
Ventricular premature complex(es) now present
Atrial premature complex(es) no longer present
 
Electronically Signed On 10- 11:02:18 CDT by Curt Bruno
[email or web address]
 
 
 
 
 
 
 
 
 
 
 
 
 
 
 
 
 
  <ELECTRONICALLY SIGNED>
   By: Curt Bruno MD, [facility]   
  10/26/19     1102
D: 10/25/19 1254                           _____________________________________
T: 10/25/19 1254                           Curt Bruno MD, FAC     /EPI

## 2019-10-29 ENCOUNTER — HOSPITAL ENCOUNTER (OUTPATIENT)
Dept: HOSPITAL 35 - MRI | Age: 69
End: 2019-10-29
Payer: COMMERCIAL

## 2019-10-29 VITALS — DIASTOLIC BLOOD PRESSURE: 96 MMHG | SYSTOLIC BLOOD PRESSURE: 154 MMHG

## 2019-10-29 VITALS — HEIGHT: 65 IN | BODY MASS INDEX: 37.7 KG/M2 | WEIGHT: 226.3 LBS

## 2019-10-29 DIAGNOSIS — M47.26: Primary | ICD-10-CM

## 2019-10-29 DIAGNOSIS — M25.78: ICD-10-CM

## 2019-10-29 DIAGNOSIS — M48.061: ICD-10-CM

## 2019-10-29 DIAGNOSIS — M41.86: ICD-10-CM

## 2019-10-29 DIAGNOSIS — M47.817: ICD-10-CM

## 2019-10-29 DIAGNOSIS — M51.16: ICD-10-CM

## 2019-10-29 PROCEDURE — 62110: CPT

## 2019-10-29 PROCEDURE — 70005: CPT

## 2019-10-29 PROCEDURE — 62900: CPT

## 2019-11-07 ENCOUNTER — HOSPITAL ENCOUNTER (EMERGENCY)
Dept: HOSPITAL 35 - ER | Age: 69
LOS: 1 days | Discharge: HOME | End: 2019-11-08
Payer: COMMERCIAL

## 2019-11-07 VITALS — BODY MASS INDEX: 38.32 KG/M2 | HEIGHT: 65 IN | WEIGHT: 230.01 LBS

## 2019-11-07 DIAGNOSIS — J06.9: Primary | ICD-10-CM

## 2019-11-07 DIAGNOSIS — F32.9: ICD-10-CM

## 2019-11-07 DIAGNOSIS — G47.30: ICD-10-CM

## 2019-11-07 DIAGNOSIS — Z90.49: ICD-10-CM

## 2019-11-07 DIAGNOSIS — Z88.8: ICD-10-CM

## 2019-11-07 DIAGNOSIS — E66.9: ICD-10-CM

## 2019-11-07 DIAGNOSIS — J44.9: ICD-10-CM

## 2019-11-07 DIAGNOSIS — F17.210: ICD-10-CM

## 2019-11-07 DIAGNOSIS — R07.9: ICD-10-CM

## 2019-11-07 DIAGNOSIS — I10: ICD-10-CM

## 2019-11-07 LAB
ALBUMIN SERPL-MCNC: 3.2 G/DL (ref 3.4–5)
ALT SERPL-CCNC: 34 U/L (ref 30–65)
ANION GAP SERPL CALC-SCNC: 9 MMOL/L (ref 7–16)
AST SERPL-CCNC: 31 U/L (ref 15–37)
BASOPHILS NFR BLD AUTO: 1.2 % (ref 0–2)
BILIRUB SERPL-MCNC: 0.8 MG/DL
BILIRUB UR-MCNC: NEGATIVE MG/DL
BUN SERPL-MCNC: 17 MG/DL (ref 7–18)
CALCIUM SERPL-MCNC: 8.4 MG/DL (ref 8.5–10.1)
CHLORIDE SERPL-SCNC: 99 MMOL/L (ref 98–107)
CO2 SERPL-SCNC: 27 MMOL/L (ref 21–32)
COLOR UR: YELLOW
CREAT SERPL-MCNC: 1 MG/DL (ref 0.7–1.3)
EOSINOPHIL NFR BLD: 0.9 % (ref 0–3)
ERYTHROCYTE [DISTWIDTH] IN BLOOD BY AUTOMATED COUNT: 13.8 % (ref 10.5–14.5)
GLUCOSE SERPL-MCNC: 87 MG/DL (ref 74–106)
GRANULOCYTES NFR BLD MANUAL: 71.9 % (ref 36–66)
HCT VFR BLD CALC: 43.6 % (ref 42–52)
HGB BLD-MCNC: 14.3 GM/DL (ref 14–18)
KETONES UR STRIP-MCNC: NEGATIVE MG/DL
LYMPHOCYTES NFR BLD AUTO: 14 % (ref 24–44)
MCH RBC QN AUTO: 32.2 PG (ref 26–34)
MCHC RBC AUTO-ENTMCNC: 32.7 G/DL (ref 28–37)
MCV RBC: 98.3 FL (ref 80–100)
MONOCYTES NFR BLD: 12 % (ref 1–8)
NEUTROPHILS # BLD: 3.4 THOU/UL (ref 1.4–8.2)
PLATELET # BLD: 172 THOU/UL (ref 150–400)
POTASSIUM SERPL-SCNC: 3.9 MMOL/L (ref 3.5–5.1)
PROT SERPL-MCNC: 6.9 G/DL (ref 6.4–8.2)
RBC # BLD AUTO: 4.44 MIL/UL (ref 4.5–6)
RBC # UR STRIP: NEGATIVE /UL
SODIUM SERPL-SCNC: 135 MMOL/L (ref 136–145)
SP GR UR STRIP: 1.01 (ref 1–1.03)
TROPONIN I SERPL-MCNC: <0.06 NG/ML (ref ?–0.06)
URINE CLARITY: CLEAR
URINE GLUCOSE-RANDOM*: NEGATIVE
URINE LEUKOCYTES-REFLEX: NEGATIVE
URINE NITRITE-REFLEX: NEGATIVE
URINE PROTEIN (DIPSTICK): NEGATIVE
UROBILINOGEN UR STRIP-ACNC: 0.2 E.U./DL (ref 0.2–1)
WBC # BLD AUTO: 4.7 THOU/UL (ref 4–11)

## 2019-11-08 VITALS — DIASTOLIC BLOOD PRESSURE: 85 MMHG | SYSTOLIC BLOOD PRESSURE: 156 MMHG

## 2019-11-08 NOTE — EKG
[place] [address] [facility] [address]
[place]  [address]
Phone:  [phone]                    ELECTROCARDIOGRAM REPORT      
_______________________________________________________________________________
 
Name:       ENEDELIA JUAREZ JR           Room #:                     [facility]#:      [id number]     Account #:      [id number]  
Admission:  19    Attend Phys:                          
Discharge:  19    Date of Birth:  [birth year]  
                                                          Report #: [id number]
   [id number]
_______________________________________________________________________________
THIS REPORT FOR:   //name//                          
 
                         [facility] ED
                                       
Test Date:    2019               Test Time:    21:09:38
Pat Name:     MCDANIELS JONES           Department:   
Patient ID:   [id number]            Room:          
Gender:       M                        [facility]   [facility]
:          1950               Requested By: Shona Ly
Order Number: [id number] MD:   Lyle Caballero
                                 Measurements
Intervals                              Axis          
Rate:         101                      P:            57
WI:           142                      QRS:          -11
QRSD:         90                       T:            30
QT:           348                                    
QTc:          452                                    
                           Interpretive Statements
Sinus tachycardia
Probable left atrial enlargement
Low voltage, precordial leads
Compared to ECG 10/25/2019 12:54:13
Low QRS voltage now present
Sinus rhythm no longer present
Ventricular premature complex(es) no longer present
 
Electronically Signed On 2019 12:27:44 CST by Lyle Caballero
[email or web address]
 
 
 
 
 
 
 
 
 
 
 
 
 
 
 
  <ELECTRONICALLY SIGNED>
   By: Lyle Caballero MD               
  19     1227
D: 19                           _____________________________________
T: 19                           Lyle Caballero MD                 /EPI
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
## 2019-11-13 ENCOUNTER — HOSPITAL ENCOUNTER (OUTPATIENT)
Dept: HOSPITAL 35 - PAIN | Age: 69
End: 2019-11-13
Attending: CLINICAL NURSE SPECIALIST
Payer: COMMERCIAL

## 2019-11-13 VITALS — BODY MASS INDEX: 38.72 KG/M2 | WEIGHT: 232.4 LBS | HEIGHT: 65 IN

## 2019-11-13 VITALS — DIASTOLIC BLOOD PRESSURE: 98 MMHG | SYSTOLIC BLOOD PRESSURE: 145 MMHG

## 2019-11-13 DIAGNOSIS — G89.4: ICD-10-CM

## 2019-11-13 DIAGNOSIS — I10: ICD-10-CM

## 2019-11-13 DIAGNOSIS — J44.9: ICD-10-CM

## 2019-11-13 DIAGNOSIS — M51.16: ICD-10-CM

## 2019-11-13 DIAGNOSIS — K75.89: ICD-10-CM

## 2019-11-13 DIAGNOSIS — Z88.8: ICD-10-CM

## 2019-11-13 DIAGNOSIS — Z79.899: ICD-10-CM

## 2019-11-13 DIAGNOSIS — M48.061: Primary | ICD-10-CM

## 2019-11-13 NOTE — NUR
Pain Clinic Assessment:
 
1. History of Osteoarthritis:
NO
   History of Rheumatoid Arthritis:
NO
 
2. Height: 5 ft. 5 in. 165.1 cm.
   Weight: 232.4 lb.  oz. 105.416 kg.
   Patient's BMI: 38.7
 
3. Vital Signs:
   BP: 145/98 Pulse: 108 Resp: 16
   Temp:  02 Sat: 97 ECG Mon:
 
4. Pain Intensity: 7-8
 
5. Fall Risk:
   Dizziness: Y  Needs help standing or walking: N
   Fallen in the last 3 months: N
   Fall risk comments:
FELL 2 MONTHS AGO  DID NOT SEEK MEDICAL
 
6. Patient on Blood Thinner: None
 
7. History of Hypertension: Y
 
8. Opioid Therapy greater than 6 weeks: Y
   Opiate Contract Signed: 10/27/17
 
9. Risk Assessment Tool Provided: LOW-0
 
10. Functional Assessment Tool: 38/70
 
11. Recreational Drug Use: Past greater than 3 mos Drug Type:
    Tobacco Use: Current Every Day Smoker Tobacco Type: Cigarettes
       Amount or Packs/day: 7-8 cig/day How Many Years: 56
    Alcohol Use: Yes  Frequency: Daily Quant: 1

## 2019-11-14 NOTE — HPC
[facility]
[address]
[place]   [address]                     PAIN MANAGEMENT CONSULTATION  
_______________________________________________________________________________
 
Name:       ENEDELIA JUAREZ            [facility] #:                     [facility]      [id number]                       Account #:      [id number]  
Admission:  11/13/19    Attend Phys:    Brandy Chilel     
Discharge:              Date of Birth:  [birth year]  
                                                          Report #: [id number]
                                                                    [id number]   
_______________________________________________________________________________
THIS REPORT FOR:   //name//                          
 
CC: Brandy Chilel
    [facility]
 
DATE OF SERVICE:  11/13/2019
 
 
CHIEF COMPLAINT:  Chronic low back pain and left lower extremity pain.
 
HISTORY OF PRESENT ILLNESS:  This is a 69-year-old gentleman who returns to the
pain clinic today for discussion about his medications and treatment options. 
He reports that his pain score is a 7-8/10 in the middle of his lumbar spine as
well as his left hip and radiates into his left thigh, is a stinging and burning
feeling that is worse with any activity and walking.  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
health issues such as blood pressure and breathing difficulties.  He feels that
his hydromorphone has been beneficial as well as periodic injections, but
unfortunately they are not lasting very long since he does need to return to
work fairly quickly after obtaining them.
 
The patient does report he has seen Dr. Blum on the 19th to discuss possible
surgery.  He also reports he has not started the tramadol or tizanidine that Dr. Pancho Schofield prescribed for him at his last visit here.  He has been to the
Emergency Room several times for various cardiac and health issues.  He does
have an appointment with Dr. Valenzuela this afternoon to discuss his ongoing
health problems.
 
ALLERGIES:  NONSTEROIDAL ANTI-INFLAMMATORIES.
 
CURRENT LIST OF MEDICATIONS:  Tramadol 50 mg p.r.n., tizanidine 2 mg p.r.n.,
hydromorphone 4 mg b.i.d., meloxicam p.o. daily, Voltaren gel as needed,
trazodone 50 mg at bedtime, cholestyramine packets daily, Ambien p.r.n.,
magnesium, potassium, Symbicort, and Lotensin 20 mg daily.
 
PQRS:
1.  He has some arthritic changes in his neck and his lumbar spine.  He is not
being treated for rheumatoid arthritis.
2.  Height is 5 feet 5 inches, weight is 232, BMI is 38.
3.  145/98, pulse is 108, respirations 16, oxygen is 97.
4.  Pain score is 7 to 8.
5.  Complains of dizziness, does not need help walking or standing, has not
fallen in the last 3 months.
6.  The patient is not on any blood thinners, but does have history of
hypertension, on medications.
7.  Opiate therapy is greater than 6 weeks; therefore, an opioid signed contract
 
 
 
[place] [id number]                     PAIN MANAGEMENT CONSULTATION  
_______________________________________________________________________________
 
Name:       ENEDELIA JUAREZ JR           Room #:                     REG [facility]#:      [id number]                       Account #:      [id number]  
Admission:  11/13/19    Attend Phys:    Brandy Chilel     
Discharge:              Date of Birth:  [birth year]  
                                                          Report #: [id number]
                                                                    [id number]   
_______________________________________________________________________________
is on the chart.  Risk assessment tool is low.  Functional assessment is 38/70.
8.  Recreational drug use in the past.  He currently smokes 7-8 cigarettes a
day.  We did discuss smoking cessation and alcohol use is occasionally.
 
According to the prescription monitoring, the patient filled his tramadol in
October, but he has not started that medication.  He continues to take Dilaudid
sparingly, which last filled was in July.  No other aberrant fills noted.  He
tells me he does safeguard his meds at all times.
 
PHYSICAL EXAMINATION:
GENERAL:  This is a well-developed, well-nourished black male who appears his
stated age, placing his current pain score at 7-8/10.  He is alert and
orientated and his affect is appropriate.
HEENT:  Normocephalic, atraumatic.  Extraocular eye muscles are intact.  Mucous
membranes are moist.
NECK:  Without adenopathy or JVD.
LUNGS:  Clear to auscultation but shortness of breath with exertion.
MUSCULOSKELETAL:  Lower extremity strength judged to be 5/5 for all major muscle
groups.  He has pain and discomfort following the L4-L5 dermatomal distribution
on the left leg.  Pain is across his lumbar spine with tenderness in the
paraspinal muscles.
 
IMPRESSION:
1.  Lumbar radiculopathy following the L4-L5 dermatomal distribution.
2.  Cervical radiculopathy.
3.  History of hepatitis.
4.  Hypertension.
5.  Chronic obstructive pulmonary disease.
6.  Complex regional pain management using opioids.
MRI findings:
1. mild dextroscoliotic curvature with disk space narrowing and
endplate degenerative changes.
2. Disk bulging at L1-L2 with mild foraminal tapering, mild bulging of the disk
annulus at L2-L3 causing moderate left foraminal stenosis.
3. Mild and severe left foraminal stenosis with disk protrusion at L4-L5
extending in the foramen, moderate posterior facet degenerative changes.
4.  Severe posterior facet degenerative changes at L5-S1 with fluid along the
right facet joint, disk and osteophyte cause severe bilateral foraminal stenosis
abutting the exiting L5 nerve root without impingement.  
 
We reviewed the fact that opiate medications are being used to provide analgesia
adequate to support activities of daily living, not attempting to achieve a
specific pain score on the 0-10 Visual Analog Scale.  The current opiate
medications are providing sufficient analgesia to allow the patient to
participate in activities of daily living.  The patient is not exhibiting any
aberrant behavior suggestive of drug diversion.  The patient is not having any
 
 
 
[facility]
[address] [place]   [address]                     PAIN MANAGEMENT CONSULTATION  
_______________________________________________________________________________
 
Name:       JUAREZMCDANIELSBRITT AVILA JR           [facility] #:                     [facility]      [id number]                       Account #:      [id number]  
Admission:  11/13/19    Attend Phys:    Brandy Chilel     
Discharge:              Date of Birth:  [birth year]  
                                                          Report #: [id number]
                                                                    [id number]   
_______________________________________________________________________________
adverse reactions to medications.  The patient is not suffering from daytime
somnolence or mental acuity changes.  The patient is managing opiate-induced
constipation with appropriate over-the-counter agents and dietary
considerations.  The patient was counseled on concern for caution with operating
a motor vehicle while using opiate medications.
 
PLAN:  We discussed treatment options with the patient today.
1.  We discussed his hypertension again present today.  The patient reports he
was only taking half of his medication.  He recently discovered this Monday and
yesterday and today has taken his full dose of antihypertensive medications. 
His blood pressure is again elevated today, though he denies a headache that he
has had in the past when his blood pressure is high.  He has seen his primary
care doctor this afternoon.  I again stressed that he needs to discuss this with
him and check his blood pressure at home.
2.  The patient reports he has not started his tramadol or his tizanidine, has
continued taking his Dilaudid for severe pain on a very minimal 1-2 basis.  His
script has lasted since July.  I explained to him he may continue that for
severe pain, but per Dr. Schofield's dictation at last visit, he would like to have
him try the tramadol 1-2 tablets a day.  We are worried about his respiratory
status and are trying to have him on a lower dose of narcotics.  The patient
verbalizes understanding.  He will try tramadol instead of the Dilaudid on a
daily basis, but we will continue his Dilaudid very sparingly.
3.  Scripts given today for Dilaudid 4 mg p.r.n. #30 with no additional refills.
 
4.  Tizanidine was prescribed last month, he has not trialled that medication. 
I will encourage him to try that for any muscle spasms that he is having, but
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  He verbalizes understanding.
5.  The patient will be sent to Radiology to get his MRI disc and report to take
to Dr. Blum.  Encouraged him to notify us once he has seen the physician to see
what the plan of care, whether it be surgery or not for him.  The patient
verbalizes understanding.  The patient will call us after that appointment.
6.  The patient is seen in collaboration with Dr. Pancho Schofield.
 
 
 
 
 
 
 
 
 
 
 
  <ELECTRONICALLY SIGNED>
   By: Brandy Chilel             
  11/14/19     1400
D: 11/13/19 1357                           _____________________________________
T: 11/13/19 1702                           Brandy Chilel               /nt

## 2019-11-19 ENCOUNTER — HOSPITAL ENCOUNTER (EMERGENCY)
Dept: HOSPITAL 35 - ER | Age: 69
Discharge: HOME | End: 2019-11-19
Payer: COMMERCIAL

## 2019-11-19 VITALS — BODY MASS INDEX: 37.65 KG/M2 | WEIGHT: 226 LBS | HEIGHT: 65 IN

## 2019-11-19 VITALS — SYSTOLIC BLOOD PRESSURE: 147 MMHG | DIASTOLIC BLOOD PRESSURE: 96 MMHG

## 2019-11-19 DIAGNOSIS — F17.210: ICD-10-CM

## 2019-11-19 DIAGNOSIS — G47.30: ICD-10-CM

## 2019-11-19 DIAGNOSIS — I10: ICD-10-CM

## 2019-11-19 DIAGNOSIS — J44.1: Primary | ICD-10-CM

## 2019-11-19 LAB
ALBUMIN SERPL-MCNC: 3.5 G/DL (ref 3.4–5)
ALT SERPL-CCNC: 35 U/L (ref 30–65)
ANION GAP SERPL CALC-SCNC: 7 MMOL/L (ref 7–16)
APTT BLD: 25.7 SECONDS (ref 24.5–32.8)
AST SERPL-CCNC: 24 U/L (ref 15–37)
BASOPHILS NFR BLD AUTO: 0 % (ref 0–2)
BILIRUB SERPL-MCNC: 0.3 MG/DL
BUN SERPL-MCNC: 12 MG/DL (ref 7–18)
CALCIUM SERPL-MCNC: 8.9 MG/DL (ref 8.5–10.1)
CHLORIDE SERPL-SCNC: 102 MMOL/L (ref 98–107)
CO2 SERPL-SCNC: 29 MMOL/L (ref 21–32)
CREAT SERPL-MCNC: 1.1 MG/DL (ref 0.7–1.3)
EOSINOPHIL NFR BLD: 0 % (ref 0–3)
ERYTHROCYTE [DISTWIDTH] IN BLOOD BY AUTOMATED COUNT: 14 % (ref 10.5–14.5)
GLUCOSE SERPL-MCNC: 95 MG/DL (ref 74–106)
GRANULOCYTES NFR BLD MANUAL: 74 % (ref 36–66)
HCT VFR BLD CALC: 42.9 % (ref 42–52)
HGB BLD-MCNC: 14 GM/DL (ref 14–18)
INR PPP: 1
LYMPHOCYTES NFR BLD AUTO: 12 % (ref 24–44)
MCH RBC QN AUTO: 32.9 PG (ref 26–34)
MCHC RBC AUTO-ENTMCNC: 32.7 G/DL (ref 28–37)
MCV RBC: 100.9 FL (ref 80–100)
MONOCYTES NFR BLD: 11 % (ref 1–8)
NEUTROPHILS # BLD: 4.6 THOU/UL (ref 1.4–8.2)
NEUTS BAND NFR BLD: 2 % (ref 0–8)
PLATELET # BLD: 189 THOU/UL (ref 150–400)
POTASSIUM SERPL-SCNC: 3.8 MMOL/L (ref 3.5–5.1)
PROT SERPL-MCNC: 7.4 G/DL (ref 6.4–8.2)
PROTHROMBIN TIME: 9.8 SECONDS (ref 9.3–11.4)
RBC # BLD AUTO: 4.25 MIL/UL (ref 4.5–6)
SODIUM SERPL-SCNC: 138 MMOL/L (ref 136–145)
TROPONIN I SERPL-MCNC: <0.06 NG/ML (ref ?–0.06)
VARIANT LYMPHS NFR BLD MANUAL: 1 %
WBC # BLD AUTO: 6 THOU/UL (ref 4–11)

## 2019-11-19 NOTE — EKG
[address] [employer]
[place]  [address]
Phone:  [phone]                    ELECTROCARDIOGRAM REPORT      
_______________________________________________________________________________
 
Name:       ENEDELIA JUAREZ            [facility] #:                     [facility]      [id number]     Account #:      [id number]  
Admission:  19    Attend Phys:                          
Discharge:  19    Date of Birth:  [birth year]  
                                                          Report #: [id number]
   [id number]
_______________________________________________________________________________
THIS REPORT FOR:   //name//                          
 
                         [facility] ED
                                       
Test Date:    2019               Test Time:    10:10:52
Pat Name:     ENEDELIA JUAREZ           Department:   
Patient ID:   [id number]            Room:          
Gender:                               Technician:   
:          1950               Requested By: lT Segovia
Order Number: [id number] MD:   Curt Bruno
                                 Measurements
Intervals                              Axis          
Rate:         98                       P:            72
UT:           139                      QRS:          22
QRSD:         89                       T:            32
QT:           367                                    
QTc:          469                                    
                           Interpretive Statements
Sinus rhythm
Normal tracing
No previous ECG available for comparison
 
Electronically Signed On 2019 16:49:11 CST by Curt Bruno
[email or web address]
 
 
 
 
 
 
 
 
 
 
 
 
 
 
 
 
 
 
 
  <ELECTRONICALLY SIGNED>
   By: Curt Bruno MD, [facility]   
  19     1649
D: 19 1010                           _____________________________________
T: 19 1010                           Curt Bruno MD, FACC     /EPI

## 2019-11-20 ENCOUNTER — HOSPITAL ENCOUNTER (OUTPATIENT)
Dept: HOSPITAL 35 - CAT | Age: 69
End: 2019-11-20
Attending: FAMILY MEDICINE
Payer: COMMERCIAL

## 2019-11-20 DIAGNOSIS — J92.9: ICD-10-CM

## 2019-11-20 DIAGNOSIS — J84.10: Primary | ICD-10-CM

## 2019-11-20 DIAGNOSIS — R91.1: ICD-10-CM

## 2019-12-06 ENCOUNTER — HOSPITAL ENCOUNTER (OUTPATIENT)
Dept: HOSPITAL 35 - PAIN | Age: 69
Discharge: HOME | End: 2019-12-06
Payer: COMMERCIAL

## 2019-12-06 VITALS — BODY MASS INDEX: 39.95 KG/M2 | WEIGHT: 234 LBS | HEIGHT: 64.02 IN

## 2019-12-06 VITALS — SYSTOLIC BLOOD PRESSURE: 158 MMHG | DIASTOLIC BLOOD PRESSURE: 96 MMHG

## 2019-12-06 DIAGNOSIS — Z79.891: ICD-10-CM

## 2019-12-06 DIAGNOSIS — J44.9: ICD-10-CM

## 2019-12-06 DIAGNOSIS — M54.16: Primary | ICD-10-CM

## 2019-12-06 DIAGNOSIS — K21.9: ICD-10-CM

## 2019-12-06 DIAGNOSIS — I10: ICD-10-CM

## 2019-12-06 DIAGNOSIS — Z88.8: ICD-10-CM

## 2019-12-06 DIAGNOSIS — Z79.899: ICD-10-CM

## 2019-12-06 DIAGNOSIS — F17.210: ICD-10-CM

## 2019-12-06 DIAGNOSIS — R42: ICD-10-CM

## 2019-12-06 DIAGNOSIS — G47.30: ICD-10-CM

## 2019-12-06 DIAGNOSIS — Z98.890: ICD-10-CM

## 2019-12-06 DIAGNOSIS — G89.29: ICD-10-CM

## 2019-12-06 DIAGNOSIS — Z86.19: ICD-10-CM

## 2019-12-06 NOTE — NUR
Pain Clinic Assessment:
 
1. History of Osteoarthritis:
NO
   History of Rheumatoid Arthritis:
NO
 
2. Height: 5 ft. 4 in. 162.6 cm.
   Weight: 234.0 lb.  oz. 106.142 kg.
   Patient's BMI: 40.1
 
3. Vital Signs:
   BP: 158/96 Pulse: 96 Resp: 15
   Temp:  02 Sat: 99 ECG Mon:
 
4. Pain Intensity: 6
 
5. Fall Risk:
   Dizziness: N  Needs help standing or walking: N
   Fallen in the last 3 months: N
   Fall risk comments:
FELL 2 MONTHS AGO  DID NOT SEEK MEDICAL
 
6. Patient on Blood Thinner: None
 
7. History of Hypertension: Y
 
8. Opioid Therapy greater than 6 weeks: Y
   Opiate Contract Signed: 10/27/17
 
9. Risk Assessment Tool Provided: LOW-0
 
10. Functional Assessment Tool: 38/70
 
11. Recreational Drug Use: Never Drug Type:
    Tobacco Use: Current Every Day Smoker Tobacco Type: Cigarettes
       Amount or Packs/day: 1/2 How Many Years: 55
    Alcohol Use: Yes  Frequency: Daily Quant: 1/2 GLASS WINE
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
## 2020-01-03 ENCOUNTER — HOSPITAL ENCOUNTER (OUTPATIENT)
Dept: HOSPITAL 35 - PAIN | Age: 70
End: 2020-01-03
Payer: COMMERCIAL

## 2020-01-03 VITALS — SYSTOLIC BLOOD PRESSURE: 151 MMHG | DIASTOLIC BLOOD PRESSURE: 97 MMHG

## 2020-01-03 VITALS — BODY MASS INDEX: 40.39 KG/M2 | HEIGHT: 64.02 IN | WEIGHT: 236.6 LBS

## 2020-01-03 DIAGNOSIS — I10: ICD-10-CM

## 2020-01-03 DIAGNOSIS — M54.16: Primary | ICD-10-CM

## 2020-01-03 DIAGNOSIS — K75.9: ICD-10-CM

## 2020-01-03 DIAGNOSIS — M54.12: ICD-10-CM

## 2020-01-03 DIAGNOSIS — Z79.891: ICD-10-CM

## 2020-01-03 DIAGNOSIS — J44.9: ICD-10-CM

## 2020-01-31 ENCOUNTER — HOSPITAL ENCOUNTER (OUTPATIENT)
Dept: HOSPITAL 35 - PAIN | Age: 70
Discharge: HOME | End: 2020-01-31
Payer: COMMERCIAL

## 2020-01-31 VITALS — SYSTOLIC BLOOD PRESSURE: 131 MMHG | DIASTOLIC BLOOD PRESSURE: 79 MMHG

## 2020-01-31 VITALS — BODY MASS INDEX: 38.76 KG/M2 | WEIGHT: 227 LBS | HEIGHT: 64.02 IN

## 2020-01-31 DIAGNOSIS — J44.9: ICD-10-CM

## 2020-01-31 DIAGNOSIS — I10: ICD-10-CM

## 2020-01-31 DIAGNOSIS — Z79.899: ICD-10-CM

## 2020-01-31 DIAGNOSIS — Z86.19: ICD-10-CM

## 2020-01-31 DIAGNOSIS — Z98.890: ICD-10-CM

## 2020-01-31 DIAGNOSIS — M54.16: Primary | ICD-10-CM

## 2020-01-31 DIAGNOSIS — K21.9: ICD-10-CM

## 2020-01-31 DIAGNOSIS — G89.29: ICD-10-CM

## 2020-01-31 NOTE — NUR
Pain Clinic Assessment:
 
1. History of Osteoarthritis:
NO
   History of Rheumatoid Arthritis:
NO
 
2. Height: 5 ft. 4 in. 162.6 cm.
   Weight: 227.0 lb.  oz. 102.967 kg.
   Patient's BMI: 38.9
 
3. Vital Signs:
   BP: 131/79 Pulse: 104 Resp: 14
   Temp:  02 Sat: 95 ECG Mon:
 
4. Pain Intensity: 5
 
5. Fall Risk:
   Dizziness: N  Needs help standing or walking: N
   Fallen in the last 3 months: N
   Fall risk comments:
FELL 2 MONTHS AGO  DID NOT SEEK MEDICAL
 
6. Patient on Blood Thinner: None
 
7. History of Hypertension: Y
 
8. Opioid Therapy greater than 6 weeks: Y
   Opiate Contract Signed: 10/27/17
 
9. Risk Assessment Tool Provided: LOW-0
 
10. Functional Assessment Tool: 38/70
 
11. Recreational Drug Use: Never Drug Type:
    Tobacco Use: Current Every Day Smoker Tobacco Type:
       Amount or Packs/day:  How Many Years:
    Alcohol Use: Yes  Frequency:  Quant:

## 2020-02-04 ENCOUNTER — HOSPITAL ENCOUNTER (OUTPATIENT)
Dept: HOSPITAL 35 - PAIN | Age: 70
End: 2020-02-04
Attending: ANESTHESIOLOGY
Payer: COMMERCIAL

## 2020-02-04 VITALS — WEIGHT: 229 LBS | HEIGHT: 64.02 IN | BODY MASS INDEX: 39.09 KG/M2

## 2020-02-04 VITALS — SYSTOLIC BLOOD PRESSURE: 156 MMHG | DIASTOLIC BLOOD PRESSURE: 99 MMHG

## 2020-02-04 DIAGNOSIS — M47.26: Primary | ICD-10-CM

## 2020-02-04 DIAGNOSIS — M47.818: ICD-10-CM

## 2020-02-04 DIAGNOSIS — M79.604: ICD-10-CM

## 2020-02-04 DIAGNOSIS — M79.605: ICD-10-CM

## 2020-02-04 DIAGNOSIS — Z79.899: ICD-10-CM

## 2020-02-04 NOTE — NUR
Pain Clinic Assessment:
 
1. History of Osteoarthritis:
NO
   History of Rheumatoid Arthritis:
NO
 
2. Height: 5 ft. 4 in. 162.6 cm.
   Weight: 229.0 lb.  oz. 103.874 kg.
   Patient's BMI: 39.3
 
3. Vital Signs:
   BP: 156/99 Pulse: 84 Resp: 22
   Temp:  02 Sat: 97 ECG Mon:
 
4. Pain Intensity: 5
 
5. Fall Risk:
   Dizziness: N  Needs help standing or walking: N
   Fallen in the last 3 months: N
   Fall risk comments:
FELL 2 MONTHS AGO  DID NOT SEEK MEDICAL
 
6. Patient on Blood Thinner: None
 
7. History of Hypertension: Y
 
8. Opioid Therapy greater than 6 weeks: Y
   Opiate Contract Signed: 10/27/17
 
9. Risk Assessment Tool Provided: LOW-0
 
10. Functional Assessment Tool: 38/70
 
11. Recreational Drug Use: Never Drug Type:
    Tobacco Use: Current Every Day Smoker Tobacco Type: Cigarettes
       Amount or Packs/day: 1 How Many Years: 55
    Alcohol Use: Yes  Frequency: Daily Quant: WINE

## 2020-02-07 NOTE — HPC
[facility]
[address]
[place]   [address]                     PAIN MANAGEMENT CONSULTATION  
_______________________________________________________________________________
 
Name:       ENEDELIA JUAREZ JR           Room #:                     REG [facility] 
ROSAMARIA#:      [id number]                       Account #:      [id number]  
Admission:  01/31/20    Attend Phys:    MINH Schofield MD    
Discharge:              Date of Birth:  [birth year]  
                                                          Report #: [id number]
                                                                    [id number]   
_______________________________________________________________________________
THIS REPORT FOR:  
 
cc:  Lester Joyce James A. DO Brown, N. Wayne MD                                            ~
THIS REPORT FOR:   //name//                          
 
CC: Lester Schofield
 
DATE OF SERVICE:  02/05/2020
 
 
CHIEF COMPLAINT:  Pain in the low back with pain that radiates down into the
leg.  Right side is most uncomfortable.
 
HISTORY:  The patient is a 69-year-old black gentleman who has been followed in
the pain clinic for some time.  He has significant problems with back pain.  He
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  He has been unable to drive since
about 11/2019.  He is limited because of the chronic pain involving his back. 
He states that he has seen a surgeon on one occasion who stated that his back
pain is from arthritis in his lumbar area.  The surgeon felt that his arthritic
changes were quite remarkable.  He did not feel that at this juncture surgery
would be the best option.  He has been given the option of spinal cord
stimulator.  The patient saw another physician who also felt that he has
significant arthritic changes in his low back and would not be a real good
candidate for surgery at this juncture.  The patient at this point has returned
to the pain clinic for an epidural steroid injection.  He would also like to
consider spinal cord stimulator in the future.
 
ALLERGIES:  No known drug allergies.
 
CURRENT MEDICATIONS:  OxyIR 1 mg p.o. t.i.d., Voltaren gel to the upper
extremities, Percocet 10 mg q.6 hours, albuterol, Antivert for vertigo,
magnesium oxide 400 mg, calcium 600 mg, vitamin D, potassium 10 mEq, Symbicort
80/4.5 b.i.d., Lotensin.
 
PAIN CLINIC ASSESSMENT AND PQRS:
1.  The patient has osteoarthritic changes in his back.  He is not being treated
for rheumatoid arthritis.
2.  Height 5 feet 4 inches, weight 227 pounds, BMI is 38.4.
3.  Vital Signs:  Blood pressure 131/79, pulse 104, respiratory rate 14, room
air saturation 95.
4.  Pain intensity is 5/10.
5.  Fall history:  The patient fell about 2 months ago.
6.  Blood thinner.  The patient is not on a blood thinning medication.
 
 
 
[place] [address]                     PAIN MANAGEMENT CONSULTATION  
_______________________________________________________________________________
 
Name:       ENEDELIA JUAREZ            Room #:                     REG [facility]#:      [id number]                       Account #:      [id number]  
Admission:  01/31/20    Attend Phys:    MINH Schofield MD    
Discharge:              Date of Birth:  [birth year]  
                                                          Report #: [id number]
                                                                    [id number]   
_______________________________________________________________________________
7.  Hypertension.  The patient is being treated for hypertension.
8.  Opioids greater than 6 weeks, low for opioid use.
9.  Recreational drug use:  The patient denies.
10.  Functional assessment tool 38/70.
11.  Tobacco:  The patient is a current smoker.
12.  Alcohol.  The patient denies constant use of alcoholic beverages.
 
PHYSICAL EXAMINATION:
GENERAL:  The patient is a well-developed, well-nourished black male, appears
his stated age.  He is alert and oriented x 3.  His affect is appropriate. 
Speech is fluent.
HEENT:  Normocephalic, atraumatic.  Extraocular eye muscles intact.  Sclerae
nonicteric.  The patient wears glasses.
NECK:  Without adenopathy or JVD.
LUNGS:  Generally clear to auscultation.
HEART:  Regular rate.
ABDOMEN:  Protuberant.
EXTREMITIES:  Upper extremity muscle strength judged to be 5/5 for the major
muscle groups in the upper extremity.  The patient without significant
scoliosis, kyphosis or lordosis.  The patient does have pain and discomfort in
lower portion of his back with pain that is radiating down to the lumbar area
involving the left and right side.  On the right side pain is radiating down to
his ankle.
 
IMPRESSION:
1.  Lumbar radiculopathy in the L4-L5 dermatomal distribution with pain down the
right leg and down to the ankle.
2.  History of cervical radiculopathy.
3.  History of hepatitis.
4.  Hypertension.
5.  Chronic obstructive pulmonary disease.
6.  Complex regional pain control using opioids.
 
RECOMMENDATIONS:  We discussed treatment options with the patient.  At this
juncture, we will proceed with another epidural steroid injection.  Risks and
benefits of the procedure were discussed.  They include but are not limited to
infection, worsening pain, no improvement in pain, nerve damage, spinal headache
and the patient elects to proceed.  The patient states that he is having quite a
bit of pain.  Has difficulty standing in the shower to bath.  He is considering
possibility of a stimulator trial.  He would like to undergo the process and
have Dr. Kline or Dr. Coronado at [facility] evaluate him for possible
stimulator trial.  He will proceed with an epidural steroid injection at this
juncture.
 
PROCEDURE NOTE:  The patient was taken to the procedure area.  He was then
assisted in getting on the examination table.  His back was sterilely prepped
 
 
 
[facility]
[address] [place]   [address]                     PAIN MANAGEMENT CONSULTATION  
_______________________________________________________________________________
 
Name:       ENEDELIA JUAREZ JR           Room #:                     REG [facility]#:      [id number]                       Account #:      [id number]  
Admission:  01/31/20    Attend Phys:    MINH Schofield MD    
Discharge:              Date of Birth:  [birth year]  
                                                          Report #: [id number]
                                                                    [id number]   
_______________________________________________________________________________
with a Betadine solution.  A 0.25% bupivacaine was infiltrated.  A 17-gauge
Tuohy with loss of resistance technique was used to gain access to the epidural
space.  There was no CSF, heme or paresthesia.  A total of 80 mg Depo-Medrol, 40
mg triamcinolone and 2 mL of 0.25% bupivacaine was injected.  Fluoroscopy was
used in anterior and posterior position as well as lateral viewing to
corroborate appropriate placement of the epidural injection.
 
We would like to thank you for letting us participate in his care.  We hope he
continues to improve.
 
The patient was given tizanidine to help control the pain as well as
hydromorphone 4 mg have been rewritten.
 
 
 
 
 
 
 
 
 
 
 
 
 
 
 
 
 
 
 
 
 
 
 
 
 
 
 
 
 
 
 
 
  <ELECTRONICALLY SIGNED>
   By: MINH Schofield MD            
  02/07/20     0839
D: 02/05/20 0817                           _____________________________________
T: 02/05/20 1321                           MINH Schofield MD              /[facility]

## 2020-03-17 ENCOUNTER — HOSPITAL ENCOUNTER (EMERGENCY)
Dept: HOSPITAL 35 - ER | Age: 70
Discharge: HOME | End: 2020-03-17
Payer: COMMERCIAL

## 2020-03-17 VITALS — BODY MASS INDEX: 38.41 KG/M2 | WEIGHT: 225 LBS | HEIGHT: 64 IN

## 2020-03-17 VITALS — DIASTOLIC BLOOD PRESSURE: 76 MMHG | SYSTOLIC BLOOD PRESSURE: 123 MMHG

## 2020-03-17 DIAGNOSIS — J44.9: ICD-10-CM

## 2020-03-17 DIAGNOSIS — R00.0: Primary | ICD-10-CM

## 2020-03-17 DIAGNOSIS — Z79.899: ICD-10-CM

## 2020-03-17 DIAGNOSIS — I10: ICD-10-CM

## 2020-03-17 DIAGNOSIS — F17.210: ICD-10-CM

## 2020-03-17 DIAGNOSIS — R00.2: ICD-10-CM

## 2020-03-17 LAB
ALBUMIN SERPL-MCNC: 3.5 G/DL (ref 3.4–5)
ALT SERPL-CCNC: 76 U/L (ref 30–65)
ANION GAP SERPL CALC-SCNC: 7 MMOL/L (ref 7–16)
AST SERPL-CCNC: 68 U/L (ref 15–37)
BASOPHILS NFR BLD AUTO: 1 % (ref 0–2)
BILIRUB SERPL-MCNC: 0.9 MG/DL
BUN SERPL-MCNC: 13 MG/DL (ref 7–18)
CALCIUM SERPL-MCNC: 9.3 MG/DL (ref 8.5–10.1)
CHLORIDE SERPL-SCNC: 100 MMOL/L (ref 98–107)
CO2 SERPL-SCNC: 29 MMOL/L (ref 21–32)
CREAT SERPL-MCNC: 1.5 MG/DL (ref 0.7–1.3)
EOSINOPHIL NFR BLD: 0.4 % (ref 0–3)
ERYTHROCYTE [DISTWIDTH] IN BLOOD BY AUTOMATED COUNT: 14.2 % (ref 10.5–14.5)
GLUCOSE SERPL-MCNC: 114 MG/DL (ref 74–106)
GRANULOCYTES NFR BLD MANUAL: 73.7 % (ref 36–66)
HCT VFR BLD CALC: 41.3 % (ref 42–52)
HGB BLD-MCNC: 13.3 GM/DL (ref 14–18)
LYMPHOCYTES NFR BLD AUTO: 13.4 % (ref 24–44)
MCH RBC QN AUTO: 31.8 PG (ref 26–34)
MCHC RBC AUTO-ENTMCNC: 32.1 G/DL (ref 28–37)
MCV RBC: 99 FL (ref 80–100)
MONOCYTES NFR BLD: 11.5 % (ref 1–8)
NEUTROPHILS # BLD: 3.6 THOU/UL (ref 1.4–8.2)
PLATELET # BLD: 176 THOU/UL (ref 150–400)
POTASSIUM SERPL-SCNC: 3.9 MMOL/L (ref 3.5–5.1)
PROT SERPL-MCNC: 7.3 G/DL (ref 6.4–8.2)
RBC # BLD AUTO: 4.17 MIL/UL (ref 4.5–6)
SODIUM SERPL-SCNC: 136 MMOL/L (ref 136–145)
TROPONIN I SERPL-MCNC: 0.06 NG/ML (ref ?–0.06)
WBC # BLD AUTO: 4.9 THOU/UL (ref 4–11)

## 2020-03-17 NOTE — EKG
[facility]
[place]   [address]                     ELECTROCARDIOGRAM REPORT      
_______________________________________________________________________________
 
Name:       ENEDELIA JUAREZ            Room #:                     REG [facility]#:      [id number]                       Account #:      [id number]  
Admission:  20    Attend Phys:                          
Discharge:              Date of Birth:  [birth year]  
                                                          Report #: [id number]
                                                                    [id number]
_______________________________________________________________________________
THIS REPORT FOR:  
 
cc:  Lester Joyce James A. DO Lundgren, Craig H. MD [facility]                                        ~
THIS REPORT FOR:   //name//                          
 
                         [facility] ED
                                       
Test Date:    2020               Test Time:    11:09:28
Pat Name:     ENEDELIA JUAREZ           Department:   
Patient ID:   [id number]            Room:          
Gender:                               Technician:   [facility]
:          1950               Requested By: Umberto Olmstead
Order Number: [id number] MD:   Curt Bruno
                                 Measurements
Intervals                              Axis          
Rate:         112                      P:            63
NE:           136                      QRS:          1
QRSD:         86                       T:            33
QT:           334                                    
QTc:          456                                    
                           Interpretive Statements
Sinus tachycardia
Ventricular premature complex
Baseline wander in lead(s) V2,V5
Compared to ECG 2019 10:10:52
Ventricular premature complex(es) now present
 
Electronically Signed On 3- 14:28:07 CDT by Curt Bruno
[email or web address]
 
 
 
 
 
 
 
 
 
 
 
 
 
  <ELECTRONICALLY SIGNED>
   By: Curt Bruno MD, [facility]   
  20     1428
D: 20 1109                           _____________________________________
T: 20 1109                           Curt Bruno MD, [facility]     /EPI

## 2020-03-23 ENCOUNTER — HOSPITAL ENCOUNTER (OUTPATIENT)
Dept: HOSPITAL 35 - SJCVC | Age: 70
End: 2020-03-23
Attending: INTERNAL MEDICINE
Payer: COMMERCIAL

## 2020-03-23 DIAGNOSIS — M06.9: ICD-10-CM

## 2020-03-23 DIAGNOSIS — M19.90: ICD-10-CM

## 2020-03-23 DIAGNOSIS — Z79.899: ICD-10-CM

## 2020-03-23 DIAGNOSIS — I10: ICD-10-CM

## 2020-03-23 DIAGNOSIS — G47.33: ICD-10-CM

## 2020-03-23 DIAGNOSIS — E66.9: ICD-10-CM

## 2020-03-23 DIAGNOSIS — Z99.89: ICD-10-CM

## 2020-03-23 DIAGNOSIS — F17.210: ICD-10-CM

## 2020-03-23 DIAGNOSIS — I47.1: Primary | ICD-10-CM

## 2020-03-23 DIAGNOSIS — J44.1: ICD-10-CM

## 2020-04-08 ENCOUNTER — HOSPITAL ENCOUNTER (OUTPATIENT)
Dept: HOSPITAL 35 - PAIN | Age: 70
End: 2020-04-08
Payer: COMMERCIAL

## 2020-04-08 DIAGNOSIS — Z79.899: ICD-10-CM

## 2020-04-08 DIAGNOSIS — I10: ICD-10-CM

## 2020-04-08 DIAGNOSIS — M54.16: Primary | ICD-10-CM

## 2020-04-08 DIAGNOSIS — J44.9: ICD-10-CM

## 2020-04-08 DIAGNOSIS — Z87.39: ICD-10-CM

## 2020-04-08 DIAGNOSIS — F11.20: ICD-10-CM

## 2020-05-06 ENCOUNTER — HOSPITAL ENCOUNTER (OUTPATIENT)
Dept: HOSPITAL 35 - PAIN | Age: 70
End: 2020-05-06
Attending: CLINICAL NURSE SPECIALIST
Payer: COMMERCIAL

## 2020-05-06 VITALS — HEIGHT: 64.02 IN | WEIGHT: 238.6 LBS | BODY MASS INDEX: 40.74 KG/M2

## 2020-05-06 VITALS — SYSTOLIC BLOOD PRESSURE: 153 MMHG | DIASTOLIC BLOOD PRESSURE: 87 MMHG

## 2020-05-06 DIAGNOSIS — F11.20: ICD-10-CM

## 2020-05-06 DIAGNOSIS — M16.0: ICD-10-CM

## 2020-05-06 DIAGNOSIS — M25.552: ICD-10-CM

## 2020-05-06 DIAGNOSIS — M25.551: ICD-10-CM

## 2020-05-06 DIAGNOSIS — J44.9: ICD-10-CM

## 2020-05-06 DIAGNOSIS — I10: ICD-10-CM

## 2020-05-06 DIAGNOSIS — Z87.39: ICD-10-CM

## 2020-05-06 DIAGNOSIS — Z79.899: ICD-10-CM

## 2020-05-06 DIAGNOSIS — M54.16: Primary | ICD-10-CM

## 2020-05-06 DIAGNOSIS — M54.5: ICD-10-CM

## 2020-05-06 DIAGNOSIS — Z86.19: ICD-10-CM

## 2020-05-06 NOTE — NUR
Pain Clinic Assessment:
 
1. History of Osteoarthritis:
NO
   History of Rheumatoid Arthritis:
NO
 
2. Height: 5 ft. 4 in. 162.6 cm.
   Weight: 238.6 lb.  oz. 108.228 kg.
   Patient's BMI: 40.9
 
3. Vital Signs:
   BP: 153/87 Pulse: 107 Resp: 22
   Temp:  02 Sat: 98 ECG Mon:
 
4. Pain Intensity: 4, SOMETIMES 8.
 
5. Fall Risk:
   Dizziness: N  Needs help standing or walking: N
   Fallen in the last 3 months: Y
   Fall risk comments:
FELL 2 MONTHS AGO  DID NOT SEEK MEDICAL
 
6. Patient on Blood Thinner: None
 
7. History of Hypertension: Y
 
8. Opioid Therapy greater than 6 weeks: Y
   Opiate Contract Signed: 10/27/17
 
9. Risk Assessment Tool Provided: LOW-0
 
10. Functional Assessment Tool: 38/70
 
11. Recreational Drug Use: Never Drug Type:
    Tobacco Use: Current Every Day Smoker Tobacco Type: Cigarettes
       Amount or Packs/day: 1/2 How Many Years: 45
    Alcohol Use: Yes  Frequency: Daily Quant: WINE AT BEDTIME

## 2020-05-07 NOTE — HPC
[facility]
[address]
[place]   [address]                     PAIN MANAGEMENT CONSULTATION  
_______________________________________________________________________________
 
Name:       NAMAN JUAREZTCHER AUSTIN MCCONNELL           Room #:                     REG JOHN LAURARubinHILARIARubin#:      [id number]                       Account #:      [id number]  
Admission:  05/06/20    Attend Phys:    Brandy Chilel     
Discharge:              Date of Birth:  [birth year]  
                                                          Report #: [id number]
                                                                    [id number]   
_______________________________________________________________________________
THIS REPORT FOR:  
 
cc:  Lester Joyce James A. DO Hocker, Amanda CNS                                            ~
CC: DAYSI Schofield MD
    
DATE OF SERVICE:  05/06/2020
 
 
CHIEF COMPLAINT:  Low back pain and bilateral hip pain.
 
HISTORY OF PRESENT ILLNESS:  This is a pleasant 69-year-old gentleman who
returns to the pain clinic today for a refill of his medications that he uses to
help treat his ongoing low back pain and bilateral hip pain.  Today, he is
reporting that he recently saw Dr. Rivero who did x-rays of his bilateral hips
and advised him to have a hip replacement.  The patient tells me that his right
hip is more painful than his left, though he does report he needs both hips
replaced.  He is hopeful to have this surgery in June, but he is wondering if he
should hold off on his spinal cord stimulator and  which surgery he
should have first.  The patient also reports that he fell yesterday on some
grocery bags and that did cause an increase in pain.  The patient feels that his
Dilaudid is helpful in decreasing his pain when he takes his medicine from an 8
to a 4.  He reports that his pain is worse with any activity and walking.  He is
wondering about a possible injection in his hips as well today.
 
ALLERGIES:  No known drug allergies.
 
CURRENT LIST OF MEDICATIONS:  Spiriva, tizanidine, Lyrica, hydromorphone,
trazodone,  Ambien, albuterol, Mag-Ox, potassium, Symbicort, and
Lotensin.
 
PATIENT'S PQRS:
1.  He has arthritic changes in his back and bilateral hips.  He is not being
treated for rheumatoid arthritis.
2.  Height is 5 feet 4 inches, weight is 238, BMI is 40.
3.  Vital signs 153/87, pulse is 107, respirations 22, oxygen sat is 98.
4.  Pain score is 4-8.
5.  Denies dizziness, does not need help walking or standing, has fallen in the
past week.  The patient is not on any blood thinners, but does take medicine for
hypertension.  His opioid therapy is greater than 6 weeks; therefore, an opioid
signed contract is on the chart.  Risk assessment tool is low.  Functional
assessment is 38/70.
6.  Recreational drug use, he denies.  He is a current smoker and does drink
occasional alcohol.
 
 
 
 
[place] [address]                     PAIN MANAGEMENT CONSULTATION  
_______________________________________________________________________________
 
Name:       ENEDELIA JUAREZ            Room #:                     REG OJHN PICKERING#:      [id number]                       Account #:      [id number]  
Admission:  05/06/20    Attend Phys:    Brandy Chilel     
Discharge:              Date of Birth:  [birth year]  
                                                          Report #: [id number]
                                                                    [id number]   
_______________________________________________________________________________
According to the prescription monitoring system, the patient is due to fill his
medications.  He was last here 1 month ago.  According to the CDC guidelines,
his morphine mEq is 32 MME per day.
 
PHYSICAL EXAMINATION:
GENERAL:  This is a well-developed, well-nourished, slightly obese gentleman who
is alert and orientated.  His affect is appropriate.
HEENT:  Normocephalic, atraumatic.  Extraocular eye muscles are intact.  The
patient is wearing glasses and a mask.
NECK:  Without adenopathy or JVD.
EXTREMITIES:  His upper extremity strength judged to be 5/5 in all major muscle
groups.  He is without significant scoliosis, kyphosis or lordosis.  He moves
with a very slow antalgic gait.  Reports pain is increased in his right hip
greater than left, but both are very painful with abduction of his hips.  Pain
is also located in his lumbosacral region of his back.
 
IMPRESSION:
1.  Lumbar radiculopathy in the L4-L5 dermatomal distribution.
2.  Successful trial of the spinal cord stimulator, awaiting surgery.
3.  Bilateral hip osteoarthritis.
4.  History of cervical radiculopathy.
5.  History of hepatitis.
6.  Hypertension.
7.  Chronic obstructive pulmonary disease.
8.  Complex medical management under written opioid agreement.
 
PLAN:
1.  We discussed treatment options with the patient today.  The patient finds
his hydromorphone gives him sufficient analgesic to allow him to participate in
his activities of daily living, though he is having increasing pain and able to
do less due to his hips have become more problematic.  He did recently see Dr. Rivero and is going to have his right hip operated on hopefully in June, and then
4 months later per his report have his left hip replaced.  The patient was
wondering about having his spinal cord stimulator implanted.  I discussed with
him that I believe it would be more beneficial for him to have his hips
replaced.  He may not need the spinal cord stimulator if that pain generator is
removed.  His back pain may be still present, but less intense after his
surgery.  The patient verbalizes understanding.  He will wait that surgery until
later in the year.
2.  We did discuss his medications pre and postop.  I explained to him to try to
take the lowest most effective dose of hydromorphone one a day prior to surgery
for at least 5 days.  Then postoperatively, he can take medication from Dr.
Mat.  He was instructed to call our office what prescriptions he does provided
for him, but encouraged him to continue his Lyrica postoperatively.
3.  The patient reports that he was unable to take three doses of Lyrica on some
days due to his sleeping schedule, but he feels that, that medicine is very
 
 
 
[facility]
[address] [place]   [address]                     PAIN MANAGEMENT CONSULTATION  
_______________________________________________________________________________
 
Name:       ENEDELIA JUAREZ JR           Room #:                     REG JOHN PICKERING#:      [id number]                       Account #:      [id number]  
Admission:  05/06/20    Attend Phys:    Brandy Chilel     
Discharge:              Date of Birth:  [birth year]  
                                                          Report #: [id number]
                                                                    [id number]   
_______________________________________________________________________________
beneficial in helping his radicular symptoms.  I explained to the patient that
if need be, he may take 2 tablets one time a day and one tablet 12 hours later. 
The patient verbalizes understanding.
4.  Scripts will be refilled for him for hydromorphone 4 mg, #60; Lyrica 50 mg,
#90 with one additional refill.  These will be sent electronically by Dr. Schofield
to his pharmacy and I will send his tizanidine 2 mg b.i.d., #60, with one
additional refill to his pharmacy.  This should get him through his surgery for
a followup in 2 months.
 
The patient is seen in collaboration today with Dr. Pancho Schofield.
 
 
 
 
 
 
 
 
 
 
 
 
 
 
 
 
 
 
 
 
 
 
 
 
 
 
 
 
 
 
 
 
 
 
  <ELECTRONICALLY SIGNED>
   By: Brandy Chilel             
  05/07/20     1428
D: 05/06/20 1044                           _____________________________________
T: 05/06/20 1232                           Brandy Chilel               /nt

## 2020-06-24 ENCOUNTER — HOSPITAL ENCOUNTER (OUTPATIENT)
Dept: HOSPITAL 35 - PAIN | Age: 70
Discharge: HOME | End: 2020-06-24
Payer: COMMERCIAL

## 2020-06-24 VITALS — BODY MASS INDEX: 40.29 KG/M2 | WEIGHT: 236 LBS | HEIGHT: 64.02 IN

## 2020-06-24 VITALS — SYSTOLIC BLOOD PRESSURE: 151 MMHG | DIASTOLIC BLOOD PRESSURE: 105 MMHG

## 2020-06-24 DIAGNOSIS — Z79.899: ICD-10-CM

## 2020-06-24 DIAGNOSIS — J44.9: ICD-10-CM

## 2020-06-24 DIAGNOSIS — G89.29: ICD-10-CM

## 2020-06-24 DIAGNOSIS — M54.12: Primary | ICD-10-CM

## 2020-06-24 DIAGNOSIS — Z98.890: ICD-10-CM

## 2020-06-24 DIAGNOSIS — F17.210: ICD-10-CM

## 2020-06-24 DIAGNOSIS — K21.9: ICD-10-CM

## 2020-06-24 DIAGNOSIS — I10: ICD-10-CM

## 2020-06-24 NOTE — NUR
Pain Clinic Assessment:
 
1. History of Osteoarthritis:
BILAT HIPS
   History of Rheumatoid Arthritis:
NO
 
2. Height: 5 ft. 4 in. 162.6 cm.
   Weight: 236.0 lb.  oz. 107.049 kg.
   Patient's BMI: 40.5
 
3. Vital Signs:
   BP: 151/105 Pulse: 98 Resp: 22
   Temp:  02 Sat: 100 ECG Mon:
 
4. Pain Intensity: 7
 
5. Fall Risk:
   Dizziness: N  Needs help standing or walking: N
   Fallen in the last 3 months: Y
   Fall risk comments:
FELL 2 MONTHS AGO  DID NOT SEEK MEDICAL
 
6. Patient on Blood Thinner: None
 
7. History of Hypertension: Y
 
8. Opioid Therapy greater than 6 weeks: Y
   Opiate Contract Signed: 10/27/17
 
9. Risk Assessment Tool Provided: LOW-0
 
10. Functional Assessment Tool: 38/70
 
11. Recreational Drug Use: Never Drug Type:
    Tobacco Use: Current Every Day Smoker Tobacco Type:
       Amount or Packs/day:  How Many Years:
    Alcohol Use: Yes  Frequency:  Quant:
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
## 2020-07-17 ENCOUNTER — HOSPITAL ENCOUNTER (OUTPATIENT)
Dept: HOSPITAL 96 - M.LAB | Age: 70
End: 2020-07-17
Attending: INTERNAL MEDICINE
Payer: MEDICARE

## 2020-07-17 DIAGNOSIS — R13.10: ICD-10-CM

## 2020-07-17 DIAGNOSIS — Z01.812: Primary | ICD-10-CM

## 2020-07-17 DIAGNOSIS — Z11.59: ICD-10-CM

## 2020-07-21 ENCOUNTER — HOSPITAL ENCOUNTER (OUTPATIENT)
Dept: HOSPITAL 35 - RAD | Age: 70
End: 2020-07-21
Attending: INTERNAL MEDICINE
Payer: COMMERCIAL

## 2020-07-21 DIAGNOSIS — J44.9: Primary | ICD-10-CM

## 2020-07-22 ENCOUNTER — HOSPITAL ENCOUNTER (OUTPATIENT)
Dept: HOSPITAL 96 - M.SUR | Age: 70
Discharge: HOME | End: 2020-07-22
Attending: INTERNAL MEDICINE
Payer: MEDICARE

## 2020-07-22 DIAGNOSIS — I10: ICD-10-CM

## 2020-07-22 DIAGNOSIS — F17.210: ICD-10-CM

## 2020-07-22 DIAGNOSIS — R13.10: ICD-10-CM

## 2020-07-22 DIAGNOSIS — K22.2: ICD-10-CM

## 2020-07-22 DIAGNOSIS — Z88.8: ICD-10-CM

## 2020-07-22 DIAGNOSIS — F41.9: ICD-10-CM

## 2020-07-22 DIAGNOSIS — K31.89: ICD-10-CM

## 2020-07-22 DIAGNOSIS — K29.80: ICD-10-CM

## 2020-07-22 DIAGNOSIS — K22.8: ICD-10-CM

## 2020-07-22 DIAGNOSIS — G47.30: ICD-10-CM

## 2020-07-22 DIAGNOSIS — J44.9: ICD-10-CM

## 2020-07-22 DIAGNOSIS — Z98.890: ICD-10-CM

## 2020-07-22 DIAGNOSIS — Z79.899: ICD-10-CM

## 2020-07-22 DIAGNOSIS — K29.50: ICD-10-CM

## 2020-07-22 DIAGNOSIS — K44.9: ICD-10-CM

## 2020-07-22 DIAGNOSIS — K21.0: ICD-10-CM

## 2020-07-22 DIAGNOSIS — R10.13: Primary | ICD-10-CM

## 2020-07-22 LAB
ANION GAP SERPL CALC-SCNC: 11 MMOL/L (ref 7–16)
BUN SERPL-MCNC: 21 MG/DL (ref 7–18)
CALCIUM SERPL-MCNC: 8.1 MG/DL (ref 8.5–10.1)
CHLORIDE SERPL-SCNC: 102 MMOL/L (ref 98–107)
CO2 SERPL-SCNC: 24 MMOL/L (ref 21–32)
CREAT SERPL-MCNC: 1.4 MG/DL (ref 0.6–1.3)
GLUCOSE SERPL-MCNC: 98 MG/DL (ref 70–99)
HCT VFR BLD CALC: 38.7 % (ref 42–52)
HGB BLD-MCNC: 12.8 GM/DL (ref 14–18)
MCH RBC QN AUTO: 31.7 PG (ref 26–34)
MCHC RBC AUTO-ENTMCNC: 33.2 G/DL (ref 28–37)
MCV RBC: 95.5 FL (ref 80–100)
MPV: 8.4 FL. (ref 7.2–11.1)
PLATELET COUNT*: 158 THOU/UL (ref 150–400)
POTASSIUM SERPL-SCNC: 3.9 MMOL/L (ref 3.5–5.1)
RBC # BLD AUTO: 4.05 MIL/UL (ref 4.5–6)
RDW-CV: 14.2 % (ref 10.5–14.5)
SODIUM SERPL-SCNC: 137 MMOL/L (ref 136–145)
WBC # BLD AUTO: 6 THOU/UL (ref 4–11)

## 2020-07-22 NOTE — EKG
[place] [address]
Phone:  [phone]                     ELECTROCARDIOGRAM REPORT      
_______________________________________________________________________________
 
Name:         ENEDELIA JUAREZ JR           Room:                     [facility]#:    [id number]     Account #:     [id number]  
Admission:    20    Attend Phys:   William Beatty MD
Discharge:                Date of Birth: [birth year]  
Date of Service: 2027  Report #:      [id number]
        [id number]
_______________________________________________________________________________
THIS REPORT FOR:  //name//                      
 
                          [facility]
                                       
Test Date:    2020               Test Time:    08:27:38
Pat Name:     ENEDELIA JUAREZ           Department:   
Patient ID:   [id number]            Room:          
Gender:                               Technician:   
:          1950               Requested By: William Beatty
Order Number: [id number]    Oniel MD:   Scott Robert
                                 Measurements
Intervals                              Axis          
Rate:         103                      P:            70
TX:           134                      QRS:          5
QRSD:         83                       T:            26
QT:           349                                    
QTc:          457                                    
                           Interpretive Statements
Sinus tachycardia
Atrial premature complex
Probable left atrial enlargement
No previous ECG available for comparison
Electronically Signed On 2020 15:09:01 CDT by Scott Robert
[email or web address]
 
 
 
 
 
 
 
 
 
 
 
 
 
 
 
 
 
 
 
 
  <ELECTRONICALLY SIGNED>
                                           By: Scott Robert MD, [facility]     
  20     1509
D: 20   _____________________________________
T: 20   Scott Robert MD, [facility]       /EPI

## 2020-07-22 NOTE — PROC
[facility] 
[address] [place]  [address]                    PROCEDURE REPORT              
_______________________________________________________________________________
 
Name:       ENEDELIA JUAREZ JR            Room:                      [facility].#:  [id number]      Account #:      [id number]  
Admission:  07/22/20     Attend Phys:    William Beatty MD   
Discharge:               Date of Birth:  [birth year]  
         Report #: [id number]
                                                                                
_______________________________________________________________________________
THIS REPORT FOR:  //name//                      
 
cc:  Lester Joyce James A. DO                                                 ~
THIS REPORT FOR:  //name//                      
 
For GI report, please see the Provation report in Perceptive 7 content.
 
 
 
 
 
 
 
 
 
 
 
 
 
 
 
 
 
 
 
 
 
 
 
 
 
 
 
 
 
 
 
 
 
 
 
 
 
                       
                                        By:                                
                 
D: 07/22/20     _______________________________________
T: 07/28/20 [facility]6Medical Records Staff KALEB       /AL

## 2020-07-23 ENCOUNTER — HOSPITAL ENCOUNTER (OUTPATIENT)
Dept: HOSPITAL 35 - SJCVC | Age: 70
End: 2020-07-23
Attending: INTERNAL MEDICINE
Payer: COMMERCIAL

## 2020-07-23 DIAGNOSIS — J44.1: ICD-10-CM

## 2020-07-23 DIAGNOSIS — E78.5: ICD-10-CM

## 2020-07-23 DIAGNOSIS — E66.9: ICD-10-CM

## 2020-07-23 DIAGNOSIS — M06.9: ICD-10-CM

## 2020-07-23 DIAGNOSIS — R00.0: Primary | ICD-10-CM

## 2020-07-23 DIAGNOSIS — G47.33: ICD-10-CM

## 2020-07-23 DIAGNOSIS — F17.210: ICD-10-CM

## 2020-07-23 DIAGNOSIS — Z87.19: ICD-10-CM

## 2020-07-23 DIAGNOSIS — I10: ICD-10-CM

## 2020-07-23 DIAGNOSIS — Z79.899: ICD-10-CM

## 2020-07-23 DIAGNOSIS — M16.11: ICD-10-CM

## 2020-07-23 DIAGNOSIS — Z99.89: ICD-10-CM

## 2020-07-23 DIAGNOSIS — M19.90: ICD-10-CM

## 2020-07-23 NOTE — PATH
[address]  [address]                    PATHOLOGY RPT PROCEDURE       
_______________________________________________________________________________
 
Name:       RASHAUN JUAREZ JR            Room:                      [facility]#:  [id number]      Account #:      [id number]  
Admission:  07/22/20     Date of Birth:  [birth year]  
Discharge:                             Report #:    [id number]
                                                         Path Case #: [id number]
_______________________________________________________________________________
 
[facility] Accession Number: [id number]
.                                                                01
Material submitted:                                        .
PART A: duodenum - DUODENUM BIOPSY R/O DUODENITIS
PART B: esophagus - DISTAL ESOPHAGEAL BIOPSY R/O PHILLIPS'S . Modifiers:
distal
PART C: stomach - ANTRAL BIOPSY
.                                                                01
Clinical history:                                          .
Dyshagia, R/O duodenitis, R/O Phillips's
.                                                                02
**********************************************************************
Diagnosis:
A.  Duodenum biopsy:
- Mild nonspecific active duodenitis, negative for granulomas and
dysplasia.
.
B.  Distal esophageal biopsy:
- Benign esophageal and gastric/columnar types mucosa with moderate
chronic and active inflammation typical of reflux and with abundant goblet
cells compatible with Phillips's metaplasia, negative for granulomas and
dysplasia.
.
C.  Antrum biopsy:
- Mild chronic antral gastritis suggesting reactive gastropathy (chemical
gastritis), negative for Helicobacter pylori organisms, granulomas and
dysplasia.
(JORGE:klaus; 07/23/2020)
.
Special stain on C:  H. pylori immuno
QMS  07/23/2020  1318 Local
**********************************************************************
.                                                                02
Electronically signed:                                     .
Ajay Alicea MD, Pathologist
NPI- [id number]
.                                                                01
Gross description:                                         .
A.  The specimen is received in formalin, labeled "Rashaun Juarez",
"duodenum biopsy".  Received are multiple segments of pale tan soft tissue
ranging in size from 0.1 cm to 0.2 cm.  The specimen is entirely submitted
in cassette A1.
.
B.  The specimen is received in formalin, labeled "Rashaun Juarez",
"distal esophagus".  Received are multiple segments of pale tan-white soft
tissue ranging in size from 0.1 cm to 0.3 cm.  The specimen is entirely
submitted in cassette B1.
.
 
[place] [address]                    PATHOLOGY RPT PROCEDURE       
_______________________________________________________________________________
 
Name:       RASHAUN JUAREZ JR            Room:                      [facility]#:  [id number]      Account #:      [id number]  
Admission:  07/22/20     Date of Birth:  [birth year]  
Discharge:                             Report #:    [id number]
                                                         Path Case #: [id number]
_______________________________________________________________________________
C.  The specimen is received in formalin, labeled "Juarez, [place]ney",
"antral biopsy".  Received are 2 segments of pale white-tan soft tissue
measuring 0.1 and 0.3 cm.  The specimen is entirely submitted in cassette
C1.(SNA; 7/22/2020)
MARLY/ABEL  07/22/2020  1850 Local
.                                                                02
Pathologist provided ICD-10:
K29.80, K20.9, K29.50
.                                                                02
CPT                                                        .
265214, 953098, 255489, T64983
Specimen Comment: A courtesy copy of this report has been sent to [phone],
[phone]
Specimen Comment: [phone]
Specimen Comment: Report sent to  / DR JONES
***Performed at:  01
   [facility] [address], [place], [place]  [id number]
   MD Han Montgomery MD Phone:  [phone]
***Performed at:  02
   [facility] [address] [address] [place], [place]  [id number]
   MD Ajay Alicea MD Phone:  [phone]

## 2020-08-18 ENCOUNTER — HOSPITAL ENCOUNTER (INPATIENT)
Dept: HOSPITAL 35 - ER | Age: 70
LOS: 2 days | Discharge: NURSING, REHAB OR LONG-TERM CARE | DRG: 175 | End: 2020-08-20
Attending: INTERNAL MEDICINE | Admitting: INTERNAL MEDICINE
Payer: COMMERCIAL

## 2020-08-18 VITALS — WEIGHT: 253.31 LBS | BODY MASS INDEX: 42.2 KG/M2 | HEIGHT: 65 IN

## 2020-08-18 VITALS — SYSTOLIC BLOOD PRESSURE: 138 MMHG | DIASTOLIC BLOOD PRESSURE: 69 MMHG

## 2020-08-18 VITALS — SYSTOLIC BLOOD PRESSURE: 154 MMHG | DIASTOLIC BLOOD PRESSURE: 80 MMHG

## 2020-08-18 VITALS — DIASTOLIC BLOOD PRESSURE: 78 MMHG | SYSTOLIC BLOOD PRESSURE: 146 MMHG

## 2020-08-18 DIAGNOSIS — K59.00: ICD-10-CM

## 2020-08-18 DIAGNOSIS — E66.9: ICD-10-CM

## 2020-08-18 DIAGNOSIS — J18.9: ICD-10-CM

## 2020-08-18 DIAGNOSIS — G47.33: ICD-10-CM

## 2020-08-18 DIAGNOSIS — I26.99: Primary | ICD-10-CM

## 2020-08-18 DIAGNOSIS — M47.26: ICD-10-CM

## 2020-08-18 DIAGNOSIS — Z79.899: ICD-10-CM

## 2020-08-18 DIAGNOSIS — J44.0: ICD-10-CM

## 2020-08-18 DIAGNOSIS — Z96.643: ICD-10-CM

## 2020-08-18 DIAGNOSIS — F17.210: ICD-10-CM

## 2020-08-18 DIAGNOSIS — M47.22: ICD-10-CM

## 2020-08-18 DIAGNOSIS — Z47.89: ICD-10-CM

## 2020-08-18 DIAGNOSIS — Z86.19: ICD-10-CM

## 2020-08-18 DIAGNOSIS — J96.01: ICD-10-CM

## 2020-08-18 DIAGNOSIS — I10: ICD-10-CM

## 2020-08-18 DIAGNOSIS — G47.00: ICD-10-CM

## 2020-08-18 LAB
ALBUMIN SERPL-MCNC: 3.1 G/DL (ref 3.4–5)
ALT SERPL-CCNC: 18 U/L (ref 30–65)
ANION GAP SERPL CALC-SCNC: 8 MMOL/L (ref 7–16)
AST SERPL-CCNC: 27 U/L (ref 15–37)
BILIRUB SERPL-MCNC: 0.2 MG/DL (ref 0.2–1)
BUN SERPL-MCNC: 10 MG/DL (ref 7–18)
CALCIUM SERPL-MCNC: 9.3 MG/DL (ref 8.5–10.1)
CHLORIDE SERPL-SCNC: 103 MMOL/L (ref 98–107)
CO2 SERPL-SCNC: 29 MMOL/L (ref 21–32)
CREAT SERPL-MCNC: 0.9 MG/DL (ref 0.7–1.3)
GLUCOSE SERPL-MCNC: 114 MG/DL (ref 74–106)
POTASSIUM SERPL-SCNC: 4.1 MMOL/L (ref 3.5–5.1)
PROT SERPL-MCNC: 7.6 G/DL (ref 6.4–8.2)
SODIUM SERPL-SCNC: 140 MMOL/L (ref 136–145)
TROPONIN I SERPL-MCNC: 0.09 NG/ML (ref ?–0.06)

## 2020-08-18 PROCEDURE — 10081 I&D PILONIDAL CYST COMP: CPT

## 2020-08-19 VITALS — SYSTOLIC BLOOD PRESSURE: 137 MMHG | DIASTOLIC BLOOD PRESSURE: 62 MMHG

## 2020-08-19 VITALS — SYSTOLIC BLOOD PRESSURE: 155 MMHG | DIASTOLIC BLOOD PRESSURE: 91 MMHG

## 2020-08-19 VITALS — SYSTOLIC BLOOD PRESSURE: 129 MMHG | DIASTOLIC BLOOD PRESSURE: 74 MMHG

## 2020-08-19 VITALS — DIASTOLIC BLOOD PRESSURE: 91 MMHG | SYSTOLIC BLOOD PRESSURE: 155 MMHG

## 2020-08-19 VITALS — DIASTOLIC BLOOD PRESSURE: 76 MMHG | SYSTOLIC BLOOD PRESSURE: 134 MMHG

## 2020-08-19 VITALS — DIASTOLIC BLOOD PRESSURE: 59 MMHG | SYSTOLIC BLOOD PRESSURE: 128 MMHG

## 2020-08-19 LAB
ANION GAP SERPL CALC-SCNC: 10 MMOL/L (ref 7–16)
BASOPHILS NFR BLD AUTO: 1 % (ref 0–2)
BUN SERPL-MCNC: 9 MG/DL (ref 7–18)
CALCIUM SERPL-MCNC: 9.3 MG/DL (ref 8.5–10.1)
CHLORIDE SERPL-SCNC: 102 MMOL/L (ref 98–107)
CO2 SERPL-SCNC: 29 MMOL/L (ref 21–32)
CREAT SERPL-MCNC: 1.1 MG/DL (ref 0.7–1.3)
EOSINOPHIL NFR BLD: 0.9 % (ref 0–3)
ERYTHROCYTE [DISTWIDTH] IN BLOOD BY AUTOMATED COUNT: 16.5 % (ref 10.5–14.5)
ERYTHROCYTE [DISTWIDTH] IN BLOOD BY AUTOMATED COUNT: 16.7 % (ref 10.5–14.5)
GLUCOSE SERPL-MCNC: 151 MG/DL (ref 74–106)
GRANULOCYTES NFR BLD MANUAL: 70.2 % (ref 36–66)
HCT VFR BLD CALC: 30.5 % (ref 42–52)
HCT VFR BLD CALC: 31.7 % (ref 42–52)
HGB BLD-MCNC: 10 GM/DL (ref 14–18)
HGB BLD-MCNC: 10.1 GM/DL (ref 14–18)
LYMPHOCYTES NFR BLD AUTO: 17.4 % (ref 24–44)
MCH RBC QN AUTO: 31.1 PG (ref 26–34)
MCH RBC QN AUTO: 32.7 PG (ref 26–34)
MCHC RBC AUTO-ENTMCNC: 31.3 G/DL (ref 28–37)
MCHC RBC AUTO-ENTMCNC: 33.1 G/DL (ref 28–37)
MCV RBC: 98.8 FL (ref 80–100)
MCV RBC: 99.4 FL (ref 80–100)
MONOCYTES NFR BLD: 10.5 % (ref 1–8)
NEUTROPHILS # BLD: 4.7 THOU/UL (ref 1.4–8.2)
PLATELET # BLD: 289 THOU/UL (ref 150–400)
PLATELET # BLD: 292 THOU/UL (ref 150–400)
POTASSIUM SERPL-SCNC: 3.6 MMOL/L (ref 3.5–5.1)
RBC # BLD AUTO: 3.08 MIL/UL (ref 4.5–6)
RBC # BLD AUTO: 3.19 MIL/UL (ref 4.5–6)
SODIUM SERPL-SCNC: 141 MMOL/L (ref 136–145)
TROPONIN I SERPL-MCNC: 0.09 NG/ML (ref ?–0.06)
WBC # BLD AUTO: 6.6 THOU/UL (ref 4–11)
WBC # BLD AUTO: 7.3 THOU/UL (ref 4–11)

## 2020-08-19 PROCEDURE — 5A09357 ASSISTANCE WITH RESPIRATORY VENTILATION, LESS THAN 24 CONSECUTIVE HOURS, CONTINUOUS POSITIVE AIRWAY PRESSURE: ICD-10-PCS | Performed by: INTERNAL MEDICINE

## 2020-08-19 NOTE — 2DMMODE
[facility]
[address] [place]   [address]                   2 D/M-MODE ECHOCARDIOGRAM     
_______________________________________________________________________________
 
Name:       ENEDELIA JUAREZ            Room #:         213-P       ADM IN  
M.R.#:      [id number]                       Account #:      [id number]  
Admission:  20    Attend Phys:    Laurita Leroy
Discharge:              Date of Birth:  [birth year]  
                                                          Report #: [id number]
                                                                    [id number]
_______________________________________________________________________________
THIS REPORT FOR:  
 
cc:  Lester Joyce James A. DO Lammoglia, Francisco J. MD                                        
                                                                       ~
 
--------------- APPROVED REPORT --------------
 
 
Study performed:  2020 12:37:14
 
EXAM: Comprehensive 2D, Doppler, and color-flow 
Echocardiogram 
Patient Location: Bedside   
Room #:  213     Status:  routine
 
       BSA:         2.19
HR: 114 bpm  BP:          134/76 mmHg 
Rhythm: Sinus Tach     
 
Other Information 
Study Quality: Adequate
 
Indications
Pulmonary Embolism.
Hx: COPD, HTN, morbid obesity.
 
2D Dimensions
RVDd:  34.94 mm  
IVSd:  12.00 (7-11mm) LVOT Diam:  23.00 (18-24mm) 
LVDd:  48.21 mm  
PWd:  12.00 (7-11mm) 
LVDs:  30.52 (25-40mm) 
Aortic Root:  34.30 mm 
 
Volumes
Left Atrial Volume (Systole) 
Single Plane 4CH:  33.59 mL Single Plane 2CH:  33.71 mL
 
Aortic Valve
AoV Peak Torres.:  1.47 m/s 
AO Peak Gr.:  8.59 mmHg  LVOT Max P.46 mmHg
    LVOT Max V:  1.45 m/s
JANIYA Vmax: 4.33 cm2  
 
 
 
[facility]
[address]
[place]  [address]
Phone:  [phone]                    2 D/M-MODE ECHOCARDIOGRAM     
_______________________________________________________________________________
 
Name:            ENEDELIA JUAREZ            Room #:        213-P       [facility] [place]
.R.#:           [id number]          Account #:     [id number]  
Admission:       20         Attend Phys:   Laurita Galan
Discharge:                  Date of Birth: [birth year]  
                         Report #:      [id number]
        [id number]
_______________________________________________________________________________
Mitral Valve
    E/A Ratio:  0.8
    MV Decel. Time:  138.34 ms
MV E Max Torres.:  0.95 m/s 
MV A Torres.:  1.16 m/s  
MV PHT:  40.12 ms  
IVRT:  53.06 ms   
 
Pulmonary Valve
PV Peak Torres.:  1.00 m/s PV Peak Gr.:  3.99 mmHg
 
Tricuspid Valve
 RAP Estimate:  5.00 mmHg
 
Left Ventricle
The left ventricle is normal size. There is normal LV segmental wall 
motion. Mild concentric left ventricular hypertrophy. Left 
ventricular systolic function is normal. LVEF is 65%. Mild diastolic 
dysfunction is present (impaired relaxation pattern).
 
Right Ventricle
The right ventricle is normal size. The right ventricular systolic 
function is normal.
 
Atria
The left atrium size is normal. The right atrium size is 
normal.
 
Aortic Valve
The aortic valve is normal in structure. No aortic regurgitation is 
present. There is no aortic valvular stenosis.
 
Mitral Valve
The mitral valve is normal in structure. There is no mitral valve 
regurgitation noted. No evidence of mitral valve stenosis.
 
Tricuspid Valve
The tricuspid valve is normal in structure. There is no tricuspid 
valve regurgitation noted. Unable to assess PA pressure.
 
Pulmonic Valve
Pulmonic valve is not well visualized.
 
Great Vessels
The aortic root is normal in size. The ascending aorta is normal in 
size. IVC is normal in size and collapses >50% with 
 
 
[facility]
[address]
[place]  [address]
Phone:  [phone]                    2 D/M-MODE ECHOCARDIOGRAM     
_______________________________________________________________________________
 
Name:            ENEDELIA JUAREZ            Room #:        213-[facility] [place]
[facility].#:           [id number]          Account #:     [id number]  
Admission:       20         Attend Phys:   Laurita Galan
Discharge:                  Date of Birth: [birth year]  
                         Report #:      [id number]
        [id number]
_______________________________________________________________________________
inspiration.
 
Pericardium
There is no pericardial effusion.
 
<Conclusion>
The left ventricle is normal size.
LVEF is 65%.
The aortic valve is normal in structure.
The mitral valve is normal in structure.
The tricuspid valve is normal in structure.
There is no tricuspid valve regurgitation noted.
Unable to assess PA pressure.
Pulmonic valve is not well visualized.
There is no pericardial effusion.
 
 
 
 
 
 
 
 
 
 
 
 
 
 
 
 
 
 
 
 
 
 
 
 
 
 
 
 
 
  <ELECTRONICALLY SIGNED>
   By: Jose A Rutledge MD    
  20     1336
D: 20 1336                           _____________________________________
T: 20 1336                           Jose A Rutledge MD      /INF

## 2020-08-19 NOTE — NUR
PT CARE ASSUMED APPROX 0700. ASSESSMENT AS CHARTED. PT DENIES SOA AND N/V.
REPORTS ADEQUATE PAIN MANAGEMENT OF RIGHT HIP PAIN. VSS. UP WITH MOD ASSIST
AND USE OF WALKER/GAITBELT. PT WAS LIBERATED FROM O2 THIS SHIFT. DENIES
QUESTIONS OR CONCERNS REGARDING POC. TOLERATING POC. NO DISTRESS NOTED.

## 2020-08-19 NOTE — NUR
PATIENT IS A NEW ADMISSION TO THE UNIT THIS SHIFT. HE ARRIVED VIA CART FROM
THE ER AND WAS ABLE TO AMBULATE TO THE BED INCIDENT FREE. PATIENT IS FULLY
ALERT AND ORIENTED AND ABLE TO PARTICIPATE IN ADMISSION. PATIENTS CHIEF
COMPLAINTS ARE SHORTNESS OF AIR AND PAIN IN HIP. NURSE TO COMPLETE ADMISSION
AND INITIATE PLAN OF CARE.

## 2020-08-19 NOTE — NUR
met with patient who admits with PE. He had R FRANCO on 7/28 at [facility], He
transitioned for rehab care at [facility]. He does not want to
return. he reports he is in semipvt room. He also alerted he was not feeling
well and phys at facility told him possible PNA. He went to Dr Camarena's office who
alerted go to ER. Patient feels facility was not hearing him regarding his
health concern. Therapy evals ordered. Discussed post acute list. [facility] to eval.
Patient prefers [facility] if accepted as he would have continuity of care. Patient
resides in 3 level home 13 steps to bedroom 13 steps to basement. [facility]
following.

## 2020-08-19 NOTE — NUR
Nutrition: Pt admit with progressive dypnea, PE, recent hip surgery. Received
consult related to obesity. PMH: COPD, HTN, hepatitis CA, volume overload. On
lasix. 2 weights on admit, 231# and 253#. Pt reports weighing 253# 4 years ago
and that 231# accurate. Places BMI at 38, class 2 obesity. Pt voices he has
been trying to improve his eating habits by portion control. Explained heart
healthy guidelines and need to control Na+. Pt denies need for full education
but agreed for RD to leave diet materials. Would benefit from continued
lifestyle changes and weight loss. Good appetite. Low nutrition risk.

## 2020-08-20 ENCOUNTER — HOSPITAL ENCOUNTER (INPATIENT)
Dept: HOSPITAL 35 - REHABU | Age: 70
LOS: 6 days | Discharge: HOME HEALTH | DRG: 947 | End: 2020-08-26
Attending: PHYSICAL MEDICINE & REHABILITATION | Admitting: PHYSICAL MEDICINE & REHABILITATION
Payer: COMMERCIAL

## 2020-08-20 VITALS — WEIGHT: 231 LBS | BODY MASS INDEX: 39.44 KG/M2 | HEIGHT: 64.02 IN

## 2020-08-20 VITALS — DIASTOLIC BLOOD PRESSURE: 77 MMHG | SYSTOLIC BLOOD PRESSURE: 131 MMHG

## 2020-08-20 VITALS — DIASTOLIC BLOOD PRESSURE: 65 MMHG | SYSTOLIC BLOOD PRESSURE: 128 MMHG

## 2020-08-20 VITALS — SYSTOLIC BLOOD PRESSURE: 130 MMHG | DIASTOLIC BLOOD PRESSURE: 82 MMHG

## 2020-08-20 VITALS — SYSTOLIC BLOOD PRESSURE: 97 MMHG | DIASTOLIC BLOOD PRESSURE: 52 MMHG

## 2020-08-20 DIAGNOSIS — M19.90: ICD-10-CM

## 2020-08-20 DIAGNOSIS — J45.909: ICD-10-CM

## 2020-08-20 DIAGNOSIS — G47.33: ICD-10-CM

## 2020-08-20 DIAGNOSIS — Z86.19: ICD-10-CM

## 2020-08-20 DIAGNOSIS — Z83.6: ICD-10-CM

## 2020-08-20 DIAGNOSIS — I26.99: ICD-10-CM

## 2020-08-20 DIAGNOSIS — F41.9: ICD-10-CM

## 2020-08-20 DIAGNOSIS — M54.12: ICD-10-CM

## 2020-08-20 DIAGNOSIS — J96.01: ICD-10-CM

## 2020-08-20 DIAGNOSIS — F17.210: ICD-10-CM

## 2020-08-20 DIAGNOSIS — J44.0: ICD-10-CM

## 2020-08-20 DIAGNOSIS — J18.9: ICD-10-CM

## 2020-08-20 DIAGNOSIS — M54.16: ICD-10-CM

## 2020-08-20 DIAGNOSIS — Z79.899: ICD-10-CM

## 2020-08-20 DIAGNOSIS — E87.70: ICD-10-CM

## 2020-08-20 DIAGNOSIS — Z96.641: ICD-10-CM

## 2020-08-20 DIAGNOSIS — Z79.82: ICD-10-CM

## 2020-08-20 DIAGNOSIS — E66.9: ICD-10-CM

## 2020-08-20 DIAGNOSIS — I10: ICD-10-CM

## 2020-08-20 DIAGNOSIS — R53.81: Primary | ICD-10-CM

## 2020-08-20 LAB
ANION GAP SERPL CALC-SCNC: 10 MMOL/L (ref 7–16)
BUN SERPL-MCNC: 10 MG/DL (ref 7–18)
CALCIUM SERPL-MCNC: 8.8 MG/DL (ref 8.5–10.1)
CHLORIDE SERPL-SCNC: 105 MMOL/L (ref 98–107)
CO2 SERPL-SCNC: 28 MMOL/L (ref 21–32)
CREAT SERPL-MCNC: 1 MG/DL (ref 0.7–1.3)
GLUCOSE SERPL-MCNC: 113 MG/DL (ref 74–106)
POTASSIUM SERPL-SCNC: 3.8 MMOL/L (ref 3.5–5.1)
SODIUM SERPL-SCNC: 143 MMOL/L (ref 136–145)
TROPONIN I SERPL-MCNC: 0.08 NG/ML (ref ?–0.06)

## 2020-08-20 PROCEDURE — 10112: CPT

## 2020-08-20 PROCEDURE — 5A09457 ASSISTANCE WITH RESPIRATORY VENTILATION, 24-96 CONSECUTIVE HOURS, CONTINUOUS POSITIVE AIRWAY PRESSURE: ICD-10-PCS | Performed by: PHYSICAL MEDICINE & REHABILITATION

## 2020-08-20 NOTE — NUR
Assumed pt care at 1900. Pt is alert and oriented. No sign of distress noted
in pt. Fall precaution in place. Call light within reach. Denies any pain.
Assessment completed and documented. Scheduled meds administered to pt.
Tolerated po intake. No acutte event overnight. No further needs at this time.
Continue to monitor.

## 2020-08-20 NOTE — NUR
RECEIVED PT'S CARE AROUND 0730; PT. ON BED; ALERT; DURING AM ASSESSMENT AOX4;
AM MEDICATIONS ON GIVEN; C/O PAIN OVER HIP; REFUSED PRN PAIN MEDICATION;
EDUCATED ABOUT D/C PROCESS & NEED TO KNOW DECISION AFTER D/C HOSPITAL; ST.
UNDERSTANDING; ST ON THE MONITOR; WORKER WITH PT & OT; O2 SAT ABOVE 90%;
ELEVATED HR WITH EXERTION; ASSESSMENT AS CHARGED; FOLLOWING POC; WILL WORKING
ON D/C PROCESS;

## 2020-08-20 NOTE — NUR
PT ALERT AND ORIENTED X 4.  TRANSFERRED FROM BED TO RECLINER AT START OF SHIFT
WITH ASSIST X 1.  TRANSFERRED TO BED AT HS WITHOUT DIFFICULTY.  RIGHT HIP
DRESSING C/D/I.  PT C/O PAIN IN RIGHT HIP.  OXYCODONE GIVEN AS ORDERED.  CPAP
ON DURING THE NIGHT.  BED ALARM ON FOR SAFETY.  PT APPEARS TO BE SLEEPING ON
HOURLY ROUNDS.

## 2020-08-20 NOTE — NUR
PT TO THE UNIT FROM 2N.  ORIENTED TO ROOM AND BEDSPACE.  NO CO'S OF PAIN OR
NAUSEA.  MATHIEU DIET  AND FLUIDS.  PT WITH 2-3 + ODEMA TO FEET BI- LATERALLY.
USES CPAP AT NIGHT,   NO CO'S AT THE PRESENT TIME.  APPEARS TO BE RESTING
COMFORTABLY.

## 2020-08-20 NOTE — EKG
[facility]
[place]   [address]                     ELECTROCARDIOGRAM REPORT      
_______________________________________________________________________________
 
Name:       ENEDELIA JUAREZ            Room #:         213-P       ADM IN  
M.R.#:      [id number]                       Account #:      [id number]  
Admission:  20    Attend Phys:    Laurita Leroy
Discharge:              Date of Birth:  [birth year]  
                                                          Report #: [id number]
                                                                    [id number]
_______________________________________________________________________________
THIS REPORT FOR:  
 
cc:  Lester Joyce James A. DO Couchonnal, Luis F. MD                                            ~
THIS REPORT FOR:   //name//                          
 
                         [facility] ED
                                       
Test Date:    2020               Test Time:    19:49:37
Pat Name:     ENEDELIA JUAREZ           Department:   
Patient ID:   [id number]            Room:         213
Gender:       M                        Technician:   woody
:          1950               Requested By: Umberto Kaufman
Order Number: [id number] MD:   Jad Ray
                                 Measurements
Intervals                              Axis          
Rate:         96                       P:            52
IL:           146                      QRS:          -4
QRSD:         89                       T:            48
QT:           366                                    
QTc:          463                                    
                           Interpretive Statements
Sinus rhythm
Low voltage, precordial leads
Compared to ECG 2020 11:09:28
Low QRS voltage now present
Sinus tachycardia no longer present
Ventricular premature complex(es) no longer present
Electronically Signed On 2020 8:12:14 CDT by Jad Ray
[email or web address]
 
 
 
 
 
 
 
 
 
 
 
 
 
  <ELECTRONICALLY SIGNED>
   By: Jad Ray MD        
  20
D: 20                           _____________________________________
T: 20                           Jad Ray MD          /EPI

## 2020-08-20 NOTE — NUR
[facility] acute rehab has accepted the pt and has a bed for him today. Pt is
medically ready for dc to rehab if agreeable. Pt is discussing with his wife
going to acute rehab vs home with HH. Nursing to f/u with the pt and if
agreeable dc to [facility] this morning.

## 2020-08-21 VITALS — SYSTOLIC BLOOD PRESSURE: 130 MMHG | DIASTOLIC BLOOD PRESSURE: 78 MMHG

## 2020-08-21 VITALS — SYSTOLIC BLOOD PRESSURE: 126 MMHG | DIASTOLIC BLOOD PRESSURE: 56 MMHG

## 2020-08-21 LAB
ANION GAP SERPL CALC-SCNC: 8 MMOL/L (ref 7–16)
BUN SERPL-MCNC: 11 MG/DL (ref 7–18)
CALCIUM SERPL-MCNC: 9.3 MG/DL (ref 8.5–10.1)
CHLORIDE SERPL-SCNC: 105 MMOL/L (ref 98–107)
CO2 SERPL-SCNC: 28 MMOL/L (ref 21–32)
CREAT SERPL-MCNC: 1 MG/DL (ref 0.7–1.3)
ERYTHROCYTE [DISTWIDTH] IN BLOOD BY AUTOMATED COUNT: 16.3 % (ref 10.5–14.5)
GLUCOSE SERPL-MCNC: 108 MG/DL (ref 74–106)
HCT VFR BLD CALC: 30.8 % (ref 42–52)
HGB BLD-MCNC: 9.8 GM/DL (ref 14–18)
MCH RBC QN AUTO: 31.5 PG (ref 26–34)
MCHC RBC AUTO-ENTMCNC: 31.9 G/DL (ref 28–37)
MCV RBC: 98.8 FL (ref 80–100)
PLATELET # BLD: 227 THOU/UL (ref 150–400)
POTASSIUM SERPL-SCNC: 4.1 MMOL/L (ref 3.5–5.1)
RBC # BLD AUTO: 3.12 MIL/UL (ref 4.5–6)
SODIUM SERPL-SCNC: 141 MMOL/L (ref 136–145)
WBC # BLD AUTO: 5.9 THOU/UL (ref 4–11)

## 2020-08-21 NOTE — NUR
pt up working with physical therapy. intro to cm and dcp. " all i want is 2
little sinus pills for my sinus headache, 69cents at qt. live with wife and
children. have support at home. have stairs into and lots stairs inside
home"/leona. cm passed on information at bout sinus headache to bedside
nurse. will cont following as needed for dc needs.

## 2020-08-21 NOTE — NUR
ASSUMED CARES AT 0700. PT AWAKE, ALERT AND ORIENTED*4. C/O RIGHT HIP PAIN PAIN
MEDICATION ADMINISTERED AS NEEDED. HR AND RESP ELEVATED THIS AM, WILL CONTINUE
TO MONITOR. ALL OTHER VITALS REMAIN STABLE. INCISION ON RIGHT HIP REMAINS DRY,
29 STAPLES REMOVED AND STERI STRIPS PLACE, NO DRAINAGE OR FOUL ODOR NOTED.
PT CONTINUES TO HAVE BLE EDEMA, EXTREMITIES ELEVATED AND TEDHOSE IN PLACE. PT
UP WITH SBA, GB AND WALKER AND TOLERATED WELL. Q1H VISUAL CHECKS. CALL LIGHT
WITHIN REACH. FALL PRECAUTIONS IN PLACE

## 2020-08-22 VITALS — SYSTOLIC BLOOD PRESSURE: 128 MMHG | DIASTOLIC BLOOD PRESSURE: 68 MMHG

## 2020-08-22 VITALS — SYSTOLIC BLOOD PRESSURE: 126 MMHG | DIASTOLIC BLOOD PRESSURE: 74 MMHG

## 2020-08-22 PROCEDURE — 5A09457 ASSISTANCE WITH RESPIRATORY VENTILATION, 24-96 CONSECUTIVE HOURS, CONTINUOUS POSITIVE AIRWAY PRESSURE: ICD-10-PCS | Performed by: PHYSICAL MEDICINE & REHABILITATION

## 2020-08-22 NOTE — NUR
Alert and orientated X4.  Calm and pleasant all day, cooperative with cares
and meds.  Ambulates with walker with steady gait under supervision.
Requesting med for sinus HA.  Breath sounds clear t/o, CPAP removed at 0730.
Reg HR auscultated.  Color pink with brisk capillary refill and palpable
peripheral pulses. +3 edema in feet, new pair of knee high TYSON hose applied.
Independent with voiding with urinal.  Active bowel sounds
over soft, rounded abdomen.  States he feels constipated, senna and miralax
given with AM meds.  Currently in room without s/o distress.

## 2020-08-22 NOTE — NUR
ASSUMED PT CAREA ROUND 1930. AXOX4. R HIP INCISION CDI. NO S/S ACUTE DISTRESS
NOTED OR REPORTED AT THIS TIME. WILL CONT TO MONITOR FOR ANY CHNAGES IN
CONDITION.

## 2020-08-23 VITALS — DIASTOLIC BLOOD PRESSURE: 62 MMHG | SYSTOLIC BLOOD PRESSURE: 109 MMHG

## 2020-08-23 VITALS — DIASTOLIC BLOOD PRESSURE: 75 MMHG | SYSTOLIC BLOOD PRESSURE: 124 MMHG

## 2020-08-23 NOTE — NUR
MEDICATED TWICE FOR HIP PAIN. MED OBTAINED FOR SINUS HEADACHE. TEDS OFF FOR
OVERNIGHT, BOTH FEET EDEMATOUS, RIGHT FOOT 3+ EDEMA. MELATONIN GIVEN AND ABLE
TO SLEEP AT LEAST 2 HOURS

## 2020-08-24 VITALS — SYSTOLIC BLOOD PRESSURE: 126 MMHG | DIASTOLIC BLOOD PRESSURE: 80 MMHG

## 2020-08-24 VITALS — DIASTOLIC BLOOD PRESSURE: 55 MMHG | SYSTOLIC BLOOD PRESSURE: 109 MMHG

## 2020-08-24 NOTE — NUR
ASSUMED PT CARE AROUND 1930. AXOX4. CALLS APPROPRIATELY FOR HELP. VSS. NO S/S
ACUTE DISTRESS NOTED OR REPORTED AT THIS TIME. CARE TRANSFERRED TO ANOTHER RN
AT THIS TIME.

## 2020-08-24 NOTE — NUR
ASSUMED CARES AT 0700. PT AWAKE, ALERT AND ORIENTED*4. C/O LEFT HIP PAIN, PAIN
MEDICATION ADMINISTERED AS ORDERED. VITALS REMAIN STABLE. LEFT HIP INCISION
REMAINS INTACT AND DRY. STERI STRIPS REMAINS IN PLACE. PT UP WITH 1 SBA, GB
AND WALKER AND TOLERATED WELL. PARTICIPATED WELL IN ALL THERAPIES. Q1H VISUAL
CHECKS. CALL LIGHT WITHIN REACH

## 2020-08-24 NOTE — NUR
TOOK ALL OF HIS PRN MEDS EXCEPT TYLENOL AT HS IN EFFORT TO FALL ASLEEP. SLEPT
WITH CPAP ON FOR ALMOST 3 HOURS AND THEN WAS AWAKE ALMOST AN HOUR, STATES HE
TAKES TRAZADONE AT HOME WHEN CONFRONTED WITH SIMILAR INSOMNIA, HE PLANS TO
TALK TO DOCTOR TODAY TO SEE IF HE CAN TAKE IT HERE. FOR NOW HE HAS APPEARED
TO SLEEP SINCE 2 AM. USING URINAL SINCE HIP PAIN INCREASES WHEN UP. INCISION
LOOKS GREAT WITH STERISTRIPS INTACT.

## 2020-08-25 VITALS — DIASTOLIC BLOOD PRESSURE: 72 MMHG | SYSTOLIC BLOOD PRESSURE: 138 MMHG

## 2020-08-25 VITALS — DIASTOLIC BLOOD PRESSURE: 58 MMHG | SYSTOLIC BLOOD PRESSURE: 101 MMHG

## 2020-08-25 VITALS — SYSTOLIC BLOOD PRESSURE: 138 MMHG | DIASTOLIC BLOOD PRESSURE: 72 MMHG

## 2020-08-25 LAB
ANION GAP SERPL CALC-SCNC: 9 MMOL/L (ref 7–16)
BASOPHILS NFR BLD AUTO: 2 % (ref 0–2)
BUN SERPL-MCNC: 13 MG/DL (ref 7–18)
CALCIUM SERPL-MCNC: 8.6 MG/DL (ref 8.5–10.1)
CHLORIDE SERPL-SCNC: 103 MMOL/L (ref 98–107)
CO2 SERPL-SCNC: 28 MMOL/L (ref 21–32)
CREAT SERPL-MCNC: 1.1 MG/DL (ref 0.7–1.3)
EOSINOPHIL NFR BLD: 3 % (ref 0–3)
ERYTHROCYTE [DISTWIDTH] IN BLOOD BY AUTOMATED COUNT: 16.1 % (ref 10.5–14.5)
GLUCOSE SERPL-MCNC: 114 MG/DL (ref 74–106)
GRANULOCYTES NFR BLD MANUAL: 70 % (ref 36–66)
HCT VFR BLD CALC: 31.3 % (ref 42–52)
HGB BLD-MCNC: 10.2 GM/DL (ref 14–18)
LYMPHOCYTES NFR BLD AUTO: 20 % (ref 24–44)
MAGNESIUM SERPL-MCNC: 1.9 MG/DL (ref 1.8–2.4)
MCH RBC QN AUTO: 31.6 PG (ref 26–34)
MCHC RBC AUTO-ENTMCNC: 32.4 G/DL (ref 28–37)
MCV RBC: 97.4 FL (ref 80–100)
MONOCYTES NFR BLD: 5 % (ref 1–8)
NEUTROPHILS # BLD: 4.3 THOU/UL (ref 1.4–8.2)
NEUTS BAND NFR BLD: 0 % (ref 0–8)
PLATELET # BLD: 201 THOU/UL (ref 150–400)
POTASSIUM SERPL-SCNC: 3.7 MMOL/L (ref 3.5–5.1)
RBC # BLD AUTO: 3.22 MIL/UL (ref 4.5–6)
SODIUM SERPL-SCNC: 140 MMOL/L (ref 136–145)
WBC # BLD AUTO: 6.2 THOU/UL (ref 4–11)

## 2020-08-25 NOTE — NUR
PT ALERT AND ORIENTED TIMES FOUR. VSS. PT C/O PAIN PRN AND SCHEDULED PAIN
MEDICATIONS CONTROLLING PAIN WELL. PT TOLERATES MEDS AND MEALS. PT WORKED WELL
WITH PT/OT TODAY. PT CHANGED TO MOD I WITH WALKER TODAY. PLANS TO DISCHARGE
HOME TOMORROW. PT PROGRESING TOWARDS POC GOALS.

## 2020-08-25 NOTE — NUR
team meeting, recommendation:  26th, wife stays at home. he has been. wife to
assist with medication. will need fww. outpt neruo phys .  ( pt,
ot, nursing)

## 2020-08-26 VITALS — DIASTOLIC BLOOD PRESSURE: 72 MMHG | SYSTOLIC BLOOD PRESSURE: 138 MMHG

## 2020-08-26 VITALS — SYSTOLIC BLOOD PRESSURE: 128 MMHG | DIASTOLIC BLOOD PRESSURE: 75 MMHG

## 2020-08-26 VITALS — SYSTOLIC BLOOD PRESSURE: 138 MMHG | DIASTOLIC BLOOD PRESSURE: 72 MMHG

## 2020-08-26 NOTE — NUR
FAXED REFERRAL TO [facility] SPOKE WITH INTAKE THEY RECEIVED REFERRAL AND
WILL ACCEPT. PT DISCHARGING TODAY FAXED DC ORDERS/SUMMARY TO HH RECEIVED
CONFIRMATION AND THEY WILL NOTIFY PT TIME OF VISITS.

## 2020-08-26 NOTE — NUR
PT ASSESSMENT COMPLETED AND VSS. MEDS GIVEN AS ORDERED AND WELL TOLERATED.
MOD I IN ROOM WITH WALKER. STEADY. C/O LOWER BACK PAIN. ICE AND MEDICATION
HELPFUL. CPAP ON WITH 2L AT HS. PT REFUSED CONTINOUS 02 SAT MONITOR AND RT
AWARE. SLEEPING ON AND OFF. PT EXCITED ABOUT D/C HOME TOMORROW. WILL CONTINUE
TO MONITOR FREQUENTLY.

## 2020-08-26 NOTE — NUR
ASSUMED CARE OF PT AT 0700. PT IS A&OX4 AND VITAL SIGNS ARE STABLE. ORDERS FOR
DISCHARGE THIS SHIFT. WIFE ON UNIT FOR DISCHARGE AT 1400. REVIEWED DISCHARGE
INSTRUCTIONS, MEDICATIONS, WOUND CARE, AND DISCHARGE EDUCATION WITH PT AND
WIFE. PT AND WIFE DENIED QUESTIONS AT TIME OF DISHCARGE.  NURSE ON UNIT
PRIOR TO DISCHARGE TO MEET WITH PT AND MADE ARRANGEMENTS FOR F/U APPOINTMENTS
FOR PT. PT AND WIFE INSTRUCTED TO  ELIQUIS AT OUTPATIENT PHARMACY AND
ENSURE THAT COUPON WAS IN BAG WITH MEDICATIONS PRIOR TO LEAVING. PT INFORMED
AT THAT TIME THAT OTHER MEDICATIONS WERE SENT TO PHARMACY OF CHOICE. PT AND
WIFE ASSISTED TO MEDICAL [facility] FOR DISCHARGE. PER AIDE, PT REQUIRED SET UP
ASSISTANCE TO TRANSFER TO CAR. BELONGINGS REMOVED BY PT AND WIFE AT TIME OF
DISCHARGE.

## 2020-09-09 ENCOUNTER — HOSPITAL ENCOUNTER (OUTPATIENT)
Dept: HOSPITAL 35 - PAIN | Age: 70
End: 2020-09-09
Payer: COMMERCIAL

## 2020-09-09 VITALS — DIASTOLIC BLOOD PRESSURE: 66 MMHG | SYSTOLIC BLOOD PRESSURE: 112 MMHG

## 2020-09-09 VITALS — WEIGHT: 241.2 LBS | BODY MASS INDEX: 41.18 KG/M2 | HEIGHT: 64.02 IN

## 2020-09-09 DIAGNOSIS — J44.9: ICD-10-CM

## 2020-09-09 DIAGNOSIS — M16.0: Primary | ICD-10-CM

## 2020-09-09 DIAGNOSIS — I10: ICD-10-CM

## 2020-09-09 DIAGNOSIS — N15.8: ICD-10-CM

## 2020-09-09 DIAGNOSIS — M54.16: ICD-10-CM

## 2020-09-09 DIAGNOSIS — Z79.891: ICD-10-CM

## 2020-09-09 DIAGNOSIS — Z79.899: ICD-10-CM

## 2020-09-09 NOTE — NUR
Pain Clinic Assessment:
 
1. History of Osteoarthritis:
BILAT HIPS
   History of Rheumatoid Arthritis:
NO
 
2. Height: 5 ft. 4 in. 162.6 cm.
   Weight: 241.2 lb.  oz. 109.408 kg.
   Patient's BMI: 41.4
 
3. Vital Signs:
   BP: 112/66 Pulse: 104 Resp: 22
   Temp:  02 Sat: 100 ECG Mon:
 
4. Pain Intensity: 3 , 7 AT BEDTIME
 
5. Fall Risk:
   Dizziness: Y  Needs help standing or walking: Y
   Fallen in the last 3 months: N
   Fall risk comments:
FELL 2 MONTHS AGO  DID NOT SEEK MEDICAL
 
6. Patient on Blood Thinner: ELIQUIS
 
7. History of Hypertension: Y
 
8. Opioid Therapy greater than 6 weeks: Y
   Opiate Contract Signed: 10/27/17
 
9. Risk Assessment Tool Provided: LOW-0
 
10. Functional Assessment Tool: 38/70
 
11. Recreational Drug Use: Never Drug Type:
    Tobacco Use: Current Every Day Smoker Tobacco Type:
       Amount or Packs/day:  How Many Years:
    Alcohol Use: Yes  Frequency:  Quant:

## 2020-09-09 NOTE — HPC
[facility]
[address]
[place]   [address]                     PAIN MANAGEMENT CONSULTATION  
_______________________________________________________________________________
 
Name:       NAMAN JUAREZBRITT AVILA JR           Room #:                     REG JOHN BLANK#:      [id number]                       Account #:      [id number]  
Admission:  09/09/20    Attend Phys:    MIHN Schofield MD    
Discharge:              Date of Birth:  [birth year]  
                                                          Report #: [id number]
                                                                    [id number]   
_______________________________________________________________________________
THIS REPORT FOR:  
 
cc:  Lester Joyce James A. DO Brown, N. Wayne MD                                            ~
CC: Lester Schofield
 
DATE OF SERVICE:  09/09/2020
 
 
CHIEF COMPLAINT:  "I had a renal infection.
 
HISTORY:  The patient is a 69-year-old gentleman who has been followed in the
pain clinic for quite some time.  He has returned to the pain clinic for
evaluation and renewal of his medications.  He states that he did develop some
type of infection in the renal area, mentioned having renal failure.  States
that his platelet levels changed.  He had some problems breathing.  He was felt
to have had a blood clot.  Continues to note some swelling in his leg.  He is
taking his pain pill.  Does have pain, which he describes as bone-on-bone in the
hip.  States that he has had a hip replacement.  He was hospitalized for some
time after the procedure.  He has returned today to have his medications
renewed.  He feels that he is improving since the right hip replacement and the
postoperative infection.  Notes some pain in his left and right buttocks and
pain in his knee.  Rates his pain as 3 and can increase during the day to 7 at
bedtime.  Notes his pain is exacerbated with walking, standing, climbing stairs.
 He feels that his medications improve.  He feels that his pain is improved with
his current medications.  Rest and Lyrica have been beneficial.
 
ALLERGIES:  No known drug allergies.
 
CURRENT MEDICATIONS:  Spiriva, oxycodone 10/325, tizanidine 2 mg b.i.d., Lyrica
50 mg t.i.d., hydromorphone 4 mg b.i.d., trazodone 50 mg at bedtime, albuterol
p.r.n. shortness of breath, cholestyramine 4 mg, Zolpidem 10 mg, Ventolin q. 6
hours, magnesium oxide 400 mg, potassium 10 mEq, Symbicort 80/4.5 mg b.i.d.,
Lotensin 20 mg.
 
PAIN CLINIC ASSESSMENT/PQRS:
1.  The patient has some osteoarthritic changes in his back.  He is not being
treated for rheumatoid arthritis.
2.  Height 5 feet 4 inches, weight 241 pounds, BMI is 41.
3.  Vital signs:  Blood pressure 112/66, pulse 104, respiratory rate 22,
saturation 100%.
4.  Pain intensity 3 in the morning, 7 at bedtime.
5.  Fall risk:  The patient fell about 2 months ago, did not seek medical
attention.
 
 
 
[place] [address]                     PAIN MANAGEMENT CONSULTATION  
_______________________________________________________________________________
 
Name:       ENEDELIA JUAREZ            Room #:                     REG [facility] 
LADAN#:      [id number]                       Account #:      [id number]  
Admission:  09/09/20    Attend Phys:    MINH Schofield MD    
Discharge:              Date of Birth:  [birth year]  
                                                          Report #: [id number]
                                                                    [id number]   
_______________________________________________________________________________
6.  Blood thinner:  The patient is on Eliquis.
7.  Hypertension:  The patient is being treated for hypertension.
8.  Opioids:   The patient receives medication from the pain clinic.
9.  Risk assessment tool:  Low for opioid use.
10.  Functional assessment tool 38/70.
11.  Recreational drug use:  The patient denies.
12.  Tobacco:  The patient currently smokes tobacco.
13.  Alcohol:  The patient occasionally drinks alcoholic beverages.
 
PHYSICAL EXAMINATION:
GENERAL:  The patient is a well-developed, well-nourished black male, appears
his stated age.  He is alert and oriented x 3.  He is somewhat obese.
HEENT:  Normocephalic, atraumatic.  Extraocular eye muscles intact.  Sclerae
nonicteric.  Mucous membranes are moist.  The patient is wearing glasses.  The
patient has a facial mask in place.
NECK:  Does complain of some pain and discomfort in the neck area.  Has had
history of pain radiating down into his elbow and forearm.  The patient
complains of some pain and discomfort in the area of his right hip, which has
been replaced.
MUSCULOSKELETAL:  The patient without significant scoliosis, kyphosis or
lordosis.  Walks with a slow and antalgic gait because of right hip pain.
 
IMPRESSION:
1.  Lumbar radiculopathy at the L4-L5 dermatomal distribution.
2.  Right hip replacement.
3.  Bilateral hip osteoarthritis.
4.  History of hepatitis.
5.  Hypertension.
6.  Chronic obstructive pulmonary disease.
7.  Complex medical management using opioids to help control pain.
 
RECOMMENDATIONS:  We discussed treatment options with the patient.  At this
juncture, we will renew his medications.  He will continue to monitor his
progress.  The patient had been diagnosed with a blood clot.  We went over the
pathophysiology of blood clotting.  We also discussed the significant risks that
one develops should he not continue with the blood thinning medications.  A
script for his medications has been provided.  The patient will continue with
oxycodone 5 mg 1 tablet q. 4-6 hours p.r.n.  He will also continue with
hydromorphone/Dilaudid 4 mg p.o. b.i.d. as needed.  The patient will call us if
he has any concerns.
 
 
 
 
[facility]
[address]
[place]   [address]                     PAIN MANAGEMENT CONSULTATION  
_______________________________________________________________________________
 
Name:       ENEDELIA JUAREZ JR           Room #:                     REG JOHN PICKERING#:      [id number]                       Account #:      [id number]  
Admission:  09/09/20    Attend Phys:    MINH Schofield MD    
Discharge:              Date of Birth:  [birth year]  
                                                          Report #: [id number]
                                                                    [id number]   
_______________________________________________________________________________
We would like to thank you for letting us participate in his care.  We hope he
continues to improve.
 
 
 
 
 
 
 
 
 
 
 
 
 
 
 
 
 
 
 
 
 
 
 
 
 
 
 
 
 
 
 
 
 
 
 
 
 
 
 
 
 
 
                         
   By:                               
                   
D: 09/22/20 2306                           _____________________________________
T: 09/23/20 0428                           MINH Schofield MD              /PMT

## 2020-09-21 ENCOUNTER — HOSPITAL ENCOUNTER (EMERGENCY)
Dept: HOSPITAL 35 - ER | Age: 70
Discharge: HOME | End: 2020-09-21
Payer: COMMERCIAL

## 2020-09-21 VITALS — BODY MASS INDEX: 40.97 KG/M2 | WEIGHT: 240 LBS | HEIGHT: 64 IN

## 2020-09-21 VITALS — DIASTOLIC BLOOD PRESSURE: 75 MMHG | SYSTOLIC BLOOD PRESSURE: 132 MMHG

## 2020-09-21 DIAGNOSIS — F17.210: ICD-10-CM

## 2020-09-21 DIAGNOSIS — I50.9: ICD-10-CM

## 2020-09-21 DIAGNOSIS — Z88.8: ICD-10-CM

## 2020-09-21 DIAGNOSIS — Z79.899: ICD-10-CM

## 2020-09-21 DIAGNOSIS — I11.0: ICD-10-CM

## 2020-09-21 DIAGNOSIS — J44.1: Primary | ICD-10-CM

## 2020-09-21 DIAGNOSIS — Z20.828: ICD-10-CM

## 2020-09-21 DIAGNOSIS — Z96.641: ICD-10-CM

## 2020-09-21 LAB
ALBUMIN SERPL-MCNC: 3.5 G/DL (ref 3.4–5)
ALT SERPL-CCNC: 17 U/L (ref 30–65)
ANION GAP SERPL CALC-SCNC: 9 MMOL/L (ref 7–16)
AST SERPL-CCNC: 17 U/L (ref 15–37)
BASOPHILS NFR BLD AUTO: 0.8 % (ref 0–2)
BILIRUB DIRECT SERPL-MCNC: 0.1 MG/DL
BILIRUB SERPL-MCNC: 0.4 MG/DL (ref 0.2–1)
BILIRUB UR-MCNC: NEGATIVE MG/DL
BUN SERPL-MCNC: 8 MG/DL (ref 7–18)
CALCIUM SERPL-MCNC: 9.3 MG/DL (ref 8.5–10.1)
CHLORIDE SERPL-SCNC: 102 MMOL/L (ref 98–107)
CO2 SERPL-SCNC: 26 MMOL/L (ref 21–32)
COLOR UR: YELLOW
CREAT SERPL-MCNC: 0.8 MG/DL (ref 0.7–1.3)
EOSINOPHIL NFR BLD: 2.3 % (ref 0–3)
ERYTHROCYTE [DISTWIDTH] IN BLOOD BY AUTOMATED COUNT: 14.7 % (ref 10.5–14.5)
GLUCOSE SERPL-MCNC: 100 MG/DL (ref 74–106)
GRANULOCYTES NFR BLD MANUAL: 73.7 % (ref 36–66)
HCT VFR BLD CALC: 39.4 % (ref 42–52)
HGB BLD-MCNC: 12.8 GM/DL (ref 14–18)
KETONES UR STRIP-MCNC: (no result) MG/DL
LIPASE: 73 U/L (ref 73–393)
LYMPHOCYTES NFR BLD AUTO: 13.4 % (ref 24–44)
MCH RBC QN AUTO: 30.5 PG (ref 26–34)
MCHC RBC AUTO-ENTMCNC: 32.4 G/DL (ref 28–37)
MCV RBC: 94 FL (ref 80–100)
MONOCYTES NFR BLD: 9.8 % (ref 1–8)
NEUTROPHILS # BLD: 4 THOU/UL (ref 1.4–8.2)
PLATELET # BLD: 194 THOU/UL (ref 150–400)
POTASSIUM SERPL-SCNC: 4 MMOL/L (ref 3.5–5.1)
PROT SERPL-MCNC: 7.6 G/DL (ref 6.4–8.2)
RBC # BLD AUTO: 4.19 MIL/UL (ref 4.5–6)
RBC # UR STRIP: NEGATIVE /UL
SODIUM SERPL-SCNC: 137 MMOL/L (ref 136–145)
SP GR UR STRIP: 1.02 (ref 1–1.03)
URINE CLARITY: CLEAR
URINE GLUCOSE-RANDOM*: NEGATIVE
URINE LEUKOCYTES-REFLEX: NEGATIVE
URINE NITRITE-REFLEX: NEGATIVE
URINE PROTEIN (DIPSTICK): NEGATIVE
UROBILINOGEN UR STRIP-ACNC: 0.2 E.U./DL (ref 0.2–1)
WBC # BLD AUTO: 5.5 THOU/UL (ref 4–11)

## 2020-09-24 ENCOUNTER — HOSPITAL ENCOUNTER (OUTPATIENT)
Dept: HOSPITAL 35 - SJCVCIMAG | Age: 70
End: 2020-09-24
Attending: INTERNAL MEDICINE
Payer: COMMERCIAL

## 2020-09-24 DIAGNOSIS — I26.93: ICD-10-CM

## 2020-09-24 DIAGNOSIS — R00.0: ICD-10-CM

## 2020-09-24 DIAGNOSIS — I10: ICD-10-CM

## 2020-09-24 DIAGNOSIS — J44.1: ICD-10-CM

## 2020-09-24 DIAGNOSIS — G47.33: ICD-10-CM

## 2020-09-24 DIAGNOSIS — I49.3: Primary | ICD-10-CM

## 2020-09-24 DIAGNOSIS — M16.11: ICD-10-CM

## 2020-09-24 DIAGNOSIS — Z87.19: ICD-10-CM

## 2020-09-24 DIAGNOSIS — Z99.89: ICD-10-CM

## 2020-10-28 ENCOUNTER — HOSPITAL ENCOUNTER (OUTPATIENT)
Dept: HOSPITAL 35 - PAIN | Age: 70
End: 2020-10-28
Payer: COMMERCIAL

## 2020-10-28 VITALS — HEIGHT: 64.02 IN | BODY MASS INDEX: 42.44 KG/M2 | WEIGHT: 248.6 LBS

## 2020-10-28 VITALS — DIASTOLIC BLOOD PRESSURE: 76 MMHG | SYSTOLIC BLOOD PRESSURE: 142 MMHG

## 2020-10-28 DIAGNOSIS — M54.16: Primary | ICD-10-CM

## 2020-10-28 DIAGNOSIS — I10: ICD-10-CM

## 2020-10-28 DIAGNOSIS — M16.0: ICD-10-CM

## 2020-10-28 DIAGNOSIS — M48.061: ICD-10-CM

## 2020-10-28 DIAGNOSIS — J44.9: ICD-10-CM

## 2020-10-28 DIAGNOSIS — Z79.891: ICD-10-CM
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
## 2020-10-28 NOTE — NUR
Pain Clinic Assessment:
 
1. History of Osteoarthritis:
BILAT HIPS
BACK
   History of Rheumatoid Arthritis:
NO
 
2. Height: 5 ft. 4 in. 162.6 cm.
   Weight: 248.6 lb.  oz. 112.764 kg.
   Patient's BMI: 42.7
 
3. Vital Signs:
   BP: 142/76 Pulse: 108 Resp: 18
   Temp:  02 Sat: 98 ECG Mon:
 
4. Pain Intensity: 6 BACK
 
5. Fall Risk:
   Dizziness: Y  Needs help standing or walking: Y
   Fallen in the last 3 months: N
   Fall risk comments:
FELL 2 MONTHS AGO  DID NOT SEEK MEDICAL
 
6. Patient on Blood Thinner: ELIQUIS
 
7. History of Hypertension: Y
 
8. Opioid Therapy greater than 6 weeks: Y
   Opiate Contract Signed: 10/27/17
 
9. Risk Assessment Tool Provided: LOW-0
 
10. Functional Assessment Tool: 38/70
 
11. Recreational Drug Use: Never Drug Type:
    Tobacco Use: Former Smoker Tobacco Type:
       Amount or Packs/day:  How Many Years:
    Alcohol Use: Yes  Frequency:  Quant:

## 2020-11-09 ENCOUNTER — HOSPITAL ENCOUNTER (INPATIENT)
Dept: HOSPITAL 35 - ER | Age: 70
LOS: 3 days | Discharge: HOME HEALTH | DRG: 377 | End: 2020-11-12
Attending: INTERNAL MEDICINE | Admitting: INTERNAL MEDICINE
Payer: COMMERCIAL

## 2020-11-09 VITALS — HEIGHT: 64.02 IN | WEIGHT: 240 LBS | BODY MASS INDEX: 40.97 KG/M2

## 2020-11-09 VITALS — SYSTOLIC BLOOD PRESSURE: 130 MMHG | DIASTOLIC BLOOD PRESSURE: 87 MMHG

## 2020-11-09 DIAGNOSIS — Z83.6: ICD-10-CM

## 2020-11-09 DIAGNOSIS — M54.12: ICD-10-CM

## 2020-11-09 DIAGNOSIS — E66.01: ICD-10-CM

## 2020-11-09 DIAGNOSIS — Z80.0: ICD-10-CM

## 2020-11-09 DIAGNOSIS — G47.00: ICD-10-CM

## 2020-11-09 DIAGNOSIS — K57.31: Primary | ICD-10-CM

## 2020-11-09 DIAGNOSIS — M54.16: ICD-10-CM

## 2020-11-09 DIAGNOSIS — G89.4: ICD-10-CM

## 2020-11-09 DIAGNOSIS — Z79.891: ICD-10-CM

## 2020-11-09 DIAGNOSIS — G47.33: ICD-10-CM

## 2020-11-09 DIAGNOSIS — B19.20: ICD-10-CM

## 2020-11-09 DIAGNOSIS — K64.8: ICD-10-CM

## 2020-11-09 DIAGNOSIS — I10: ICD-10-CM

## 2020-11-09 DIAGNOSIS — K63.5: ICD-10-CM

## 2020-11-09 DIAGNOSIS — M19.90: ICD-10-CM

## 2020-11-09 DIAGNOSIS — D64.9: ICD-10-CM

## 2020-11-09 DIAGNOSIS — J44.9: ICD-10-CM

## 2020-11-09 DIAGNOSIS — F41.9: ICD-10-CM

## 2020-11-09 DIAGNOSIS — Z71.6: ICD-10-CM

## 2020-11-09 DIAGNOSIS — Z87.891: ICD-10-CM

## 2020-11-09 DIAGNOSIS — Z20.828: ICD-10-CM

## 2020-11-09 DIAGNOSIS — J96.20: ICD-10-CM

## 2020-11-09 DIAGNOSIS — Z86.711: ICD-10-CM

## 2020-11-09 DIAGNOSIS — K59.00: ICD-10-CM

## 2020-11-09 LAB
ANION GAP SERPL CALC-SCNC: 9 MMOL/L (ref 7–16)
BASOPHILS NFR BLD AUTO: 0.9 % (ref 0–2)
BUN SERPL-MCNC: 20 MG/DL (ref 7–18)
CALCIUM SERPL-MCNC: 8.9 MG/DL (ref 8.5–10.1)
CHLORIDE SERPL-SCNC: 101 MMOL/L (ref 98–107)
CO2 SERPL-SCNC: 27 MMOL/L (ref 21–32)
CREAT SERPL-MCNC: 1.3 MG/DL (ref 0.7–1.3)
EOSINOPHIL NFR BLD: 0.6 % (ref 0–3)
ERYTHROCYTE [DISTWIDTH] IN BLOOD BY AUTOMATED COUNT: 15.8 % (ref 10.5–14.5)
GLUCOSE SERPL-MCNC: 94 MG/DL (ref 74–106)
GRANULOCYTES NFR BLD MANUAL: 74 % (ref 36–66)
HCT VFR BLD CALC: 42.4 % (ref 42–52)
HGB BLD-MCNC: 13.7 GM/DL (ref 14–18)
LYMPHOCYTES NFR BLD AUTO: 14.7 % (ref 24–44)
MAGNESIUM SERPL-MCNC: 2 MG/DL (ref 1.8–2.4)
MCH RBC QN AUTO: 30.6 PG (ref 26–34)
MCHC RBC AUTO-ENTMCNC: 32.3 G/DL (ref 28–37)
MCV RBC: 94.8 FL (ref 80–100)
MONOCYTES NFR BLD: 9.8 % (ref 1–8)
NEUTROPHILS # BLD: 4.3 THOU/UL (ref 1.4–8.2)
PLATELET # BLD: 192 THOU/UL (ref 150–400)
POTASSIUM SERPL-SCNC: 4.2 MMOL/L (ref 3.5–5.1)
RBC # BLD AUTO: 4.48 MIL/UL (ref 4.5–6)
SODIUM SERPL-SCNC: 137 MMOL/L (ref 136–145)
WBC # BLD AUTO: 5.8 THOU/UL (ref 4–11)

## 2020-11-09 PROCEDURE — 62900: CPT

## 2020-11-09 PROCEDURE — 10045: CPT

## 2020-11-09 PROCEDURE — 62110: CPT

## 2020-11-09 PROCEDURE — 70005: CPT

## 2020-11-10 VITALS — DIASTOLIC BLOOD PRESSURE: 73 MMHG | SYSTOLIC BLOOD PRESSURE: 117 MMHG

## 2020-11-10 VITALS — SYSTOLIC BLOOD PRESSURE: 130 MMHG | DIASTOLIC BLOOD PRESSURE: 87 MMHG

## 2020-11-10 VITALS — SYSTOLIC BLOOD PRESSURE: 103 MMHG | DIASTOLIC BLOOD PRESSURE: 64 MMHG

## 2020-11-10 VITALS — SYSTOLIC BLOOD PRESSURE: 118 MMHG | DIASTOLIC BLOOD PRESSURE: 74 MMHG

## 2020-11-10 LAB
ANION GAP SERPL CALC-SCNC: 8 MMOL/L (ref 7–16)
BE(VIVO): -0.1 MMOL/L
BUN SERPL-MCNC: 18 MG/DL (ref 7–18)
CALCIUM SERPL-MCNC: 8.5 MG/DL (ref 8.5–10.1)
CHLORIDE SERPL-SCNC: 103 MMOL/L (ref 98–107)
CO2 SERPL-SCNC: 26 MMOL/L (ref 21–32)
CREAT SERPL-MCNC: 1.2 MG/DL (ref 0.7–1.3)
ERYTHROCYTE [DISTWIDTH] IN BLOOD BY AUTOMATED COUNT: 16 % (ref 10.5–14.5)
GLUCOSE SERPL-MCNC: 106 MG/DL (ref 74–106)
HCO3 BLD-SCNC: 24.6 MMOL/L (ref 22–26)
HCT VFR BLD CALC: 37.4 % (ref 42–52)
HGB BLD-MCNC: 11.9 GM/DL (ref 14–18)
IRON SERPL-MCNC: 208 UG/DL (ref 65–175)
MCH RBC QN AUTO: 30.5 PG (ref 26–34)
MCHC RBC AUTO-ENTMCNC: 31.8 G/DL (ref 28–37)
MCV RBC: 96 FL (ref 80–100)
PCO2 BLD: 40.4 MMHG (ref 35–45)
PLATELET # BLD: 169 THOU/UL (ref 150–400)
PO2 BLD: 71.9 MMHG (ref 80–100)
POTASSIUM SERPL-SCNC: 4 MMOL/L (ref 3.5–5.1)
RBC # BLD AUTO: 3.9 MIL/UL (ref 4.5–6)
SAO2 % BLD FROM PO2: 68 % (ref 20–39)
SODIUM SERPL-SCNC: 137 MMOL/L (ref 136–145)
TIBC SERPL-MCNC: 306 UG/DL (ref 250–450)
WBC # BLD AUTO: 7 THOU/UL (ref 4–11)

## 2020-11-10 NOTE — NUR
PT ADMITTED RELATED TO DYSPNEA ON EXERTION, FATIGUE. CM REVIEWED CHART AND
SPOKE WITH CARE TEAM. CM CALLED AND SPOKE WITH PT OVER THE PHONE THIS DAY. PT
APPEARED TO BE A&O X4. CM ROLE INTRODUCED. PT INDICATED HE LIVES IN A TOWN
HOUSE WITH HIS SPOUSE WITH 5 STEPS TO ENTER AND 13 INSIDE. PT INDICATED HE HAD
BEEN INDEPEDNENT WITH ADLS PTA. HE STATED HE USED A CANE OR HIS FWW TO ASSIST
WITH MOBILITY. PT HAS A CONCENTRATOR THAT HE USES TO BLEED IN 2L THROUGH HIS
CPAP NOC. PT INDICATED HE HAD BEEN ON SERVICE WITH [facility] PTA.
HE INDICATED THAT HE ANTICIPATES RETURNING HOME ONCE MEDICALLY STABLE. CM TO
FOLLOW AS INDICATED WITH DC PLANNING.

## 2020-11-10 NOTE — NUR
Admitted from ER due to fatigue, transferred to bed safely.
A+Ox4. On telemetry; no complains and signs of chest pain, crushing sensation
and heaviness. On room air, pt reports that he's using CPAP at night.
On carb controlled diet, tolerating well; no nausea, no vomiting and no
abdominal pain noted. Continent of bowel and bladder, able to go to the toilet
and using urinal at times. Falls bundle in place.
With consult for Pulmo, Cardio and Gastro, called in consult; for OT/PT
evaluation.
Admission education, assessment and history done; admission forms signed as
well. Visiting policy exlained to him and acknowledged, designated vistor
assigned. Pt visited by his wife, update given.
To continue monitoring patient.

## 2020-11-10 NOTE — EKG
[facility]
[place]   [address]                     ELECTROCARDIOGRAM REPORT      
_______________________________________________________________________________
 
Name:       ENEDELIA JUAREZ            Room #:         455-P       ADM IN  
M.R.#:      [id number]                       Account #:      [id number]  
Admission:  20    Attend Phys:    Robert Pena MD      
Discharge:              Date of Birth:  [birth year]  
                                                          Report #: [id number]
                                                                    [id number]
_______________________________________________________________________________
THIS REPORT FOR:  
 
cc:  Lester Joyce James A. DO Santiago, Patrick MD [facility]                                         ~
THIS REPORT FOR:   //name//                          
 
                         [facility] ED
                                       
Test Date:    2020               Test Time:    18:17:06
Pat Name:     ENEDELIA JUAREZ           Department:   
Patient ID:   [id number]            Room:         [facility]
Gender:       M                        Technician:   Hasmukh
:          1950               Requested By: Felipe Galicia
Order Number: [id number] MD:   Gurmete López
                                 Measurements
Intervals                              Axis          
Rate:         91                       P:            59
NM:           146                      QRS:          -15
QRSD:         91                       T:            28
QT:           370                                    
QTc:          456                                    
                           Interpretive Statements
Sinus rhythm
 
Compared to ECG 2020 19:49:37
No significant changes
Electronically Signed On 11- 7:24:27 CST by Gurmeet López
[email or web address]
 
 
 
 
 
 
 
 
 
 
 
 
 
 
 
  <ELECTRONICALLY SIGNED>
   By: Gurmeet López MD, FACC    
  11/10/20     0724
D: 20 1817                           _____________________________________
T: 20 181                           Gurmeet López MD, [facility]      /EPI

## 2020-11-10 NOTE — NUR
ED NURSE CALLED AGAIN TO GIVE REPORT, WAS TOLD DAY SHIFT IS STILL IN HUDDLE AND
THEY WILL CALL ME BACK

## 2020-11-11 VITALS — SYSTOLIC BLOOD PRESSURE: 140 MMHG | DIASTOLIC BLOOD PRESSURE: 84 MMHG

## 2020-11-11 VITALS — SYSTOLIC BLOOD PRESSURE: 141 MMHG | DIASTOLIC BLOOD PRESSURE: 86 MMHG

## 2020-11-11 VITALS — DIASTOLIC BLOOD PRESSURE: 94 MMHG | SYSTOLIC BLOOD PRESSURE: 154 MMHG

## 2020-11-11 VITALS — SYSTOLIC BLOOD PRESSURE: 141 MMHG | DIASTOLIC BLOOD PRESSURE: 84 MMHG

## 2020-11-11 VITALS — DIASTOLIC BLOOD PRESSURE: 81 MMHG | SYSTOLIC BLOOD PRESSURE: 123 MMHG

## 2020-11-11 VITALS — SYSTOLIC BLOOD PRESSURE: 136 MMHG | DIASTOLIC BLOOD PRESSURE: 87 MMHG

## 2020-11-11 LAB
ALBUMIN SERPL-MCNC: 3.2 G/DL (ref 3.4–5)
ALT SERPL-CCNC: 22 U/L (ref 30–65)
ANION GAP SERPL CALC-SCNC: 11 MMOL/L (ref 7–16)
AST SERPL-CCNC: 15 U/L (ref 15–37)
BILIRUB SERPL-MCNC: 0.9 MG/DL (ref 0.2–1)
BUN SERPL-MCNC: 10 MG/DL (ref 7–18)
CALCIUM SERPL-MCNC: 8.8 MG/DL (ref 8.5–10.1)
CHLORIDE SERPL-SCNC: 103 MMOL/L (ref 98–107)
CO2 SERPL-SCNC: 25 MMOL/L (ref 21–32)
CREAT SERPL-MCNC: 0.9 MG/DL (ref 0.7–1.3)
ERYTHROCYTE [DISTWIDTH] IN BLOOD BY AUTOMATED COUNT: 15.5 % (ref 10.5–14.5)
GLUCOSE SERPL-MCNC: 104 MG/DL (ref 74–106)
HCT VFR BLD CALC: 38.2 % (ref 42–52)
HGB BLD-MCNC: 12.2 GM/DL (ref 14–18)
MCH RBC QN AUTO: 30.9 PG (ref 26–34)
MCHC RBC AUTO-ENTMCNC: 32 G/DL (ref 28–37)
MCV RBC: 96.6 FL (ref 80–100)
PLATELET # BLD: 155 THOU/UL (ref 150–400)
POTASSIUM SERPL-SCNC: 3.5 MMOL/L (ref 3.5–5.1)
PROT SERPL-MCNC: 6.7 G/DL (ref 6.4–8.2)
RBC # BLD AUTO: 3.95 MIL/UL (ref 4.5–6)
SODIUM SERPL-SCNC: 139 MMOL/L (ref 136–145)
WBC # BLD AUTO: 5.2 THOU/UL (ref 4–11)

## 2020-11-11 PROCEDURE — 0DBL8ZZ EXCISION OF TRANSVERSE COLON, VIA NATURAL OR ARTIFICIAL OPENING ENDOSCOPIC: ICD-10-PCS | Performed by: INTERNAL MEDICINE

## 2020-11-11 PROCEDURE — 0DBN8ZZ EXCISION OF SIGMOID COLON, VIA NATURAL OR ARTIFICIAL OPENING ENDOSCOPIC: ICD-10-PCS

## 2020-11-11 PROCEDURE — 5A09357 ASSISTANCE WITH RESPIRATORY VENTILATION, LESS THAN 24 CONSECUTIVE HOURS, CONTINUOUS POSITIVE AIRWAY PRESSURE: ICD-10-PCS

## 2020-11-11 PROCEDURE — 0DJ08ZZ INSPECTION OF UPPER INTESTINAL TRACT, VIA NATURAL OR ARTIFICIAL OPENING ENDOSCOPIC: ICD-10-PCS

## 2020-11-11 PROCEDURE — 0DBH8ZZ EXCISION OF CECUM, VIA NATURAL OR ARTIFICIAL OPENING ENDOSCOPIC: ICD-10-PCS | Performed by: SPECIALIST

## 2020-11-11 NOTE — NUR
CARE TEAM INDICATED THAT PT IS HAVING AN EGD AND COLONOSCOPY THIS DAY. CM TO
FOLLOW AS INDICATED WITH DC PLANNING.

## 2020-11-11 NOTE — NUR
assumed care approx 1930 evening 11/10. pt alert and oriented x4, appropriate
and cooperative. pt in process of bowel prep at change of shift. pt stated he
was very fatigued however felt like he was making progress with prep. pt given
hs meds, no nausea or vomiting. pt stated he was almost clear with stool. pt
advised to be NPO after midnight. pt wearing cpap machine off and on tonight.
call light in reach. will continue to monitor.

## 2020-11-11 NOTE — NUR
ASSUMED PT CARE THIS AM. PT VSS, A&OX4. PT PLEASANT, CALLS WHEN APPROPRAITE.
PT UP TO BATHROOM WITH STANDBY ASSIST USING CANE. PT COMPLAINS OF PAIN MANAGED
BY MEDS. PT REMAINED NPO PRIOR TO EGD/COLONOSCOPY. IV WAS REMOVED BY PT
ACCIDENTALY WHEN WORKING WITH OT, NEW IV STARTED IN THE RIGHT FOREARM. IV
PATENT.

## 2020-11-12 VITALS — DIASTOLIC BLOOD PRESSURE: 73 MMHG | SYSTOLIC BLOOD PRESSURE: 138 MMHG

## 2020-11-12 VITALS — SYSTOLIC BLOOD PRESSURE: 117 MMHG | DIASTOLIC BLOOD PRESSURE: 73 MMHG

## 2020-11-12 VITALS — SYSTOLIC BLOOD PRESSURE: 138 MMHG | DIASTOLIC BLOOD PRESSURE: 73 MMHG

## 2020-11-12 NOTE — NUR
CARE TEAM INDICATED THAT PT HAD ORDERS TO DC HOME YESTERDAY, IT APPEARS BASED
ON NURSES NOTES THAT PT REFUSED AS HE WANTED TO SPEAK WITH PHYSICIANS PRIOR TO
DC. CM CALLED AND SPOKE WITH PT THIS AM. HE WAS UNDERSTANDING OF DC AND
RESUMPTION OF [facility] HH SERVICES. HE INDICATED THAT HIS WIFE WOULD PROVIDE
DC TRANASPORT HOME THIS DAY. ORDERS FAXED TO [facility]. CM CALLED AND
CONFIRMED RECIEPT. PT TO HAVE RESUMPTION OF HH SERVICES SOC TOMORROW. NO OTHER
CM INTERVENTION INDICATED CASE CLOSED.

## 2020-11-12 NOTE — P
[facility]
[address] [place]   [address]                     PROCEDURE REPORT              
_______________________________________________________________________________
 
Name:       ENEDELIA JUAREZ            Room #:         455-P       ADM IN  
M.R.#:      [id number]                       Account #:      [id number]  
Admission:  11/09/20    Attend Phys:    Robert Pena MD      
Discharge:              Date of Birth:  [birth year]  
                                                          Report #: [id number]
                                                                    [id number]   
_______________________________________________________________________________
THIS REPORT FOR:  
 
cc:  Lester Joyce James A. DO McElhinney, Christian C. MD                                   ~
CC: Curt Bruno MD [facility]
    Lester Camarena MD
 
DATE OF SERVICE:  11/11/2020
 
 
PROCEDURE PERFORMED:  Colonoscopy with polypectomies.
 
HISTORY OF PRESENT ILLNESS:  The patient is a 70-year-old male with recent dark
stools, but also bright red blood per rectum and maroon type stools.  No family
history of colon cancer.
 
PLAN:  The patient was on Eliquis, has been held now for the last 2 days.  Plan
is for colonoscopy.
 
DESCRIPTION OF PROCEDURE:  The risks and benefits of the procedure were
explained to the patient, those risks including but not limited to bleeding,
perforation and the risk of sedation.  He understood these risks and gave
informed consent.  Sedation was given using propofol per anesthesia.  Next, a
digital rectal exam was initially performed, which was normal.  Next, using a
standard Olympus colonoscope, the scope was placed in the patient's anus and
advanced under direct vision to the cecum.  The overall prep was good.  In the
cecum, there was a 3 mm sessile polyp.  This was removed with cold forceps,
otherwise normal.  The ileocecal valve was normal.  The ascending colon was
normal.  In the transverse colon, there was a 4 mm sessile polyp also removed
with cold forceps.  Descending colon was normal.  Multiple diverticula noted in
the sigmoid colon, no evidence of bleeding.  Also, in the sigmoid colon was a 6
mm partially pedunculated polyp.  This was removed by snare cautery.  The rectal
mucosa was normal.  On retroflexion, small nonbleeding internal hemorrhoids were
noted.  The scope was then withdrawn and the procedure terminated.  The patient
tolerated the procedure well.
 
IMPRESSION:
1.  Small colonic polyps.
2.  Sigmoid diverticulosis, no stigmata of recent bleeding, but possible source.
3.  Internal hemorrhoids, nonbleeding.
 
RECOMMENDATIONS:
 
 
 
[address]   [address]                     PROCEDURE REPORT              
_______________________________________________________________________________
 
Name:       NEEDELIA JUAREZ JR           Room #:         455-P       [facility] [place]  
[facility].#:      [id number]                       Account #:      [id number]  
Admission:  11/09/20    Attend Phys:    Robert Pena MD      
Discharge:              Date of Birth:  [birth year]  
                                                          Report #: [id number]
                                                                    [id number]   
_______________________________________________________________________________
1.  Await biopsy results.
2.  Observe the patient post-procedure.  No evidence of bleeding on exam on EGD
or colonoscopy today.  The patient could have had a diverticular bleed recently
or bleeding from hemorrhoids, would observe at this point.
 
Thank you for allowing me to participate in his care.
 
 
 
 
 
 
 
 
 
 
 
 
 
 
 
 
 
 
 
 
 
 
 
 
 
 
 
 
 
 
 
 
 
 
 
 
 
 
  <ELECTRONICALLY SIGNED>
   By: Enrique Bennett MD   
  11/12/20     1328
D: 11/11/20 1602                           _____________________________________
T: 11/12/20 0725                           Enrique Bennett MD     /nt

## 2020-11-12 NOTE — P
[facility]
[address]
[place]   [address]                     PROCEDURE REPORT              
_______________________________________________________________________________
 
Name:       NAMAN JUAREZBRITT AVILA            Room #:         455-P       ADM IN  
M.R.#:      [id number]                       Account #:      [id number]  
Admission:  11/09/20    Attend Phys:    Robert Pena MD      
Discharge:              Date of Birth:  [birth year]  
                                                          Report #: [id number]
                                                                    [id number]   
_______________________________________________________________________________
THIS REPORT FOR:  
 
cc:  Lester Joyce James A. DO McElhinney, Christian C. MD                                   ~
CC: Curt Bruno MD [facility]
    Lester Camarena MD
 
DATE OF SERVICE:  11/11/2020
 
 
PROCEDURE PERFORMED:  Upper endoscopy.
 
HISTORY OF PRESENT ILLNESS:  The patient is a 70-year-old male who presented
with generalized fatigue, noted to have both black stools as well as bright red
blood per rectum.  He had been taking Eliquis for history of PE.  Hemoglobin on
admission was 13.7 and this dropped to 11.9.  He denies any abdominal pain. 
Denies any NSAID use.  Plan is for EGD and colonoscopy.  Stool was Hemoccult
negative x1 two days ago.
 
DESCRIPTION OF PROCEDURE:  The risks and benefits of the procedure were
explained to the patient, those risks including but not limited to bleeding,
perforation and the risk of sedation.  He understood these risks and gave
informed consent.  Sedation was given using propofol per anesthesia.  Next,
using a standard Olympus upper endoscope, the scope was placed in the patient's
mouth and advanced under direct vision through the esophagus, stomach and into
the second portion of the duodenum.  The esophagus was normal throughout.  The
GE junction was normal.  Overall, the gastric mucosa was normal.  The pylorus
was normal and patent.  The duodenal bulb, first and second portion were all
normal.  The scope was then withdrawn and the procedure terminated.  The patient
tolerated the procedure well.
 
IMPRESSION:  Normal upper endoscopy.
 
RECOMMENDATIONS:  We will proceed with colonoscopy next today.
 
Thank you for allowing me to participate in his care.
 
 
 
 
  <ELECTRONICALLY SIGNED>
   By: Enrique Bennett MD   
  11/12/20     1328
D: 11/11/20 1558                           _____________________________________
T: 11/12/20 0704                           Enrique Bennett MD     /nt

## 2020-11-12 NOTE — NUR
ASSUMED CARE OF PATIENT AT 0700. ASSESSMENT AS CHARTED. MEDS GIVEN PER MAR.
VSS. PATIENT C/O PAIN ON LOWER BACK. PAIN MEDS WERE GIVEN PRIOR TO PT/OT AND
PATIENT STATED "THEY AIN'T DOING NOTHING". PATIENT LATER EXPRESSED RELIEF WITH
LYRICA. PATIENT HAD QUESTIONS ON MEDICATIONS AND WAS GIVEN EDUCATION PRIOR TO
DISCHARGE. WAS SENT OUT WITH REFERRALS. DENIED DISTRESS OR NEEDS UPON
DISCHARGE. LEFT UNIT W NURSING STAFF, SPOUSE IN CAR.

## 2020-11-12 NOTE — NUR
VSS-AFEBRILE.  FRUSTRATED AND STATED NOT READY FOR DC.  FEELS THE PHYSICIANS
DIDNT DISCUSS ANY TEST RESULTS, AND WANTS TO SPEAK TO DR JACKSON BEFORE DC.
REQUESTING TO SEE DR MOTTA, FEELS THAT IT IS POSSIBLE THAT SOME OF HIS FATIGUE
RECENTLY IS DUE TO DEPRESSION/ANXIETY.  EDUCATED ON PLAN OF CARE, WAS ABLE TO
CALM AND REST WELL THE REST OF NIGHT.  CALLS APPROPRIATELY FOR ANY NEEDED
ASSISTANCE.

## 2020-11-12 NOTE — NUR
Hx: pt admitted with dyspnea on exertion and extreme fatigue. PMHx of
COPD, HTN, Hepatitis C, Anemia, NARCISO, BLE edema, GERD, and s/p hip replacement.
Nutrition: BMI >40, so chart reviewed and visited pt. Found to be low risk.
Pt subjectively reports a significant decrease in appetite, wt gain and poor
PO intakes PTA. RD performed food recall hx PTA and reviewed POs this admit,
and despite pt reports, he continues to eat well and had adequate intakes PTA.
No significant wt losses noted. Pt found to be at low nutrition risk. Possible
d/c soon.

## 2020-11-13 NOTE — PATH
[facility]
[address]
[place]   [address]                     PATHOLOGY RPT PROCEDURE       
_______________________________________________________________________________
 
Name:       NAMAN JUAREZBRITT AVILA            Room #:         455-P       [facility] [place]  
M.R.#:      [id number]     Account #:      [id number]  
Admission:  11/09/20    Date of Birth:  [birth year]  
Discharge:  11/12/20                                    Report #:    [id number]
                                                        Path Case #: [id number]
_______________________________________________________________________________
 
LCA Accession Number: [id number]
.                                                                01
Material submitted:                                        .
PART A: cecum - CECAL POLYP
PART B: colon - POLYP AT TRANSVERSE COLON. Modifiers: transverse
PART C: colon - POLYP AT SIGMOID COLON. Modifiers: sigmoid
.                                                                01
Clinician provided ICD-10:
R06.09
.                                                                01
Clinical history:                                          .
RECTAL BLEEDING, DYSPNEA ON EXERTION, FATIGUE
.                                                                02
**********************************************************************
Diagnosis:
A. Polyp, cecal polyp, endoscopic biopsy:
- Tubular adenoma.
- Negative for high-grade dysplasia.
.
B. Polyp, at transverse colon, endoscopic biopsy:
- Tubular adenoma.
- Negative for high-grade dysplasia.
.
C. Polyp, at sigmoid colon, endoscopic biopsy:
- Tubular adenoma.
- Negative for high-grade dysplasia.
- Additional fragments (block C2) showing hyperplastic polyp without any
dysplasia.
(IUV:pit 11/13/2020)
QTP  11/13/2020  1350 Local
**********************************************************************
.                                                                02
Electronically signed:                                     .
Alysa Greer MD, Pathologist
NPI- [id number]
.                                                                01
Gross description:                                         .
A.  Received in formalin labeled "Rashaun Juarez Jr., cecal polyp" is a
fragment of tan-brown soft tissue measuring 0.6 x 0.3 x 0.1 cm. The
specimen is submitted entirely in A1.
.
B.  Received in formalin labeled "Rashaun Juarez Jr., polyp at transverse
colon" is a fragment of tan-brown soft tissue measuring 0.5 x 0.3 x 0.1
cm. The specimen is submitted entirely in B1.
.
C.  Received in formalin labeled "Rashaun Juarez Jr., polyp at sigmoid
colon" is a tan-brown nodular mucosal polyp measuring 1.0 x 1.0 x 0.5 cm.
 
 
[place] [address]                     PATHOLOGY RPT PROCEDURE       
_______________________________________________________________________________
 
Name:       RASHAUN JUAREZ JR           Room #:         455-P       [facility] [place]  
M.R.#:      [id number]     Account #:      [id number]  
Admission:  11/09/20    Date of Birth:  [birth year]  
Discharge:  11/12/20                                    Report #:    [id number]
                                                        Path Case #: [id number]
_______________________________________________________________________________
The margin is inked and the polyp is sectioned and submitted entirely in
C1.  Also present within the container are two separate fragments of
tan-brown soft tissue measuring in aggregate 0.5 x 0.3 x 0.1 cm, which are
submitted in C2. ([facility]; 11/12/2020)
[facility]  11/12/2020  1513 Local
.                                                                02
Pathologist provided ICD-10:
D12.0, D12.3, D12.5
.                                                                02
CPT                                                        .
335632, 167340, 611372
Specimen Comment: A courtesy copy of this report has been sent to [phone]
[phone]
Specimen Comment: [phone], [phone]
Specimen Comment: Report sent to ,DR JACKSON / DR JONES
***Performed at:  01
   [address], [place]  [id number]
   MD Pritesh Pratt MD Phone:  [phone]
***Performed at:  02
   [address]  [id number]
   MD Alysa Greer MD Phone:  [phone]

## 2020-12-09 ENCOUNTER — HOSPITAL ENCOUNTER (OUTPATIENT)
Dept: HOSPITAL 35 - PAIN | Age: 70
End: 2020-12-09
Payer: COMMERCIAL

## 2020-12-09 VITALS — WEIGHT: 248 LBS | HEIGHT: 64.02 IN | BODY MASS INDEX: 42.34 KG/M2

## 2020-12-09 VITALS — DIASTOLIC BLOOD PRESSURE: 95 MMHG | SYSTOLIC BLOOD PRESSURE: 166 MMHG

## 2020-12-09 DIAGNOSIS — Z72.89: ICD-10-CM

## 2020-12-09 DIAGNOSIS — M54.5: Primary | ICD-10-CM

## 2020-12-09 DIAGNOSIS — G89.29: ICD-10-CM

## 2020-12-09 DIAGNOSIS — F17.210: ICD-10-CM

## 2020-12-09 DIAGNOSIS — M25.552: ICD-10-CM

## 2020-12-09 DIAGNOSIS — M25.561: ICD-10-CM

## 2020-12-09 DIAGNOSIS — I10: ICD-10-CM

## 2020-12-09 DIAGNOSIS — J44.9: ICD-10-CM

## 2020-12-09 NOTE — NUR
Pain Clinic Assessment:
 
1. History of Osteoarthritis:
BILAT HIPS
BACK
   History of Rheumatoid Arthritis:
NO
 
2. Height: 5 ft. 4 in. 162.6 cm.
   Weight: 248.0 lb.  oz. 112.492 kg.
   Patient's BMI: 42.5
 
3. Vital Signs:
   BP: 166/95 Pulse: 113 Resp: 20
   Temp:  02 Sat: 100 ECG Mon:
 
4. Pain Intensity: 3
 
5. Fall Risk:
   Dizziness: N  Needs help standing or walking: N
   Fallen in the last 3 months: N
   Fall risk comments:
FELL 2 MONTHS AGO  DID NOT SEEK MEDICAL
 
6. Patient on Blood Thinner: ELIQUIS
 
7. History of Hypertension: Y
 
8. Opioid Therapy greater than 6 weeks: Y
   Opiate Contract Signed: 10/27/17
 
9. Risk Assessment Tool Provided: LOW-0
 
10. Functional Assessment Tool: 38/70
 
11. Recreational Drug Use: Never Drug Type:
    Tobacco Use: Current Some Day Smoker Tobacco Type:
       Amount or Packs/day: 2/ DAY How Many Years: 57
    Alcohol Use: Yes  Frequency: Daily Quant: 1-2 GLASSES OF WINE

## 2020-12-16 ENCOUNTER — HOSPITAL ENCOUNTER (OUTPATIENT)
Dept: HOSPITAL 35 - SJCVC | Age: 70
End: 2020-12-16
Attending: INTERNAL MEDICINE
Payer: COMMERCIAL

## 2020-12-16 DIAGNOSIS — Z99.89: ICD-10-CM

## 2020-12-16 DIAGNOSIS — F17.210: ICD-10-CM

## 2020-12-16 DIAGNOSIS — J44.1: ICD-10-CM

## 2020-12-16 DIAGNOSIS — E78.5: ICD-10-CM

## 2020-12-16 DIAGNOSIS — G47.33: ICD-10-CM

## 2020-12-16 DIAGNOSIS — Z79.899: ICD-10-CM

## 2020-12-16 DIAGNOSIS — Z72.89: ICD-10-CM

## 2020-12-16 DIAGNOSIS — R00.0: Primary | ICD-10-CM

## 2020-12-16 DIAGNOSIS — Z86.711: ICD-10-CM

## 2020-12-16 DIAGNOSIS — E66.9: ICD-10-CM

## 2020-12-16 DIAGNOSIS — F41.9: ICD-10-CM

## 2020-12-16 DIAGNOSIS — J45.909: ICD-10-CM

## 2020-12-16 DIAGNOSIS — I10: ICD-10-CM

## 2020-12-22 ENCOUNTER — HOSPITAL ENCOUNTER (OUTPATIENT)
Dept: HOSPITAL 35 - LAB | Age: 70
End: 2020-12-22
Payer: COMMERCIAL

## 2020-12-22 DIAGNOSIS — Z01.812: Primary | ICD-10-CM

## 2020-12-22 DIAGNOSIS — Z20.828: ICD-10-CM

## 2021-01-04 ENCOUNTER — HOSPITAL ENCOUNTER (OUTPATIENT)
Dept: HOSPITAL 35 - LAB | Age: 71
End: 2021-01-04
Payer: COMMERCIAL

## 2021-01-04 DIAGNOSIS — Z01.812: Primary | ICD-10-CM

## 2021-01-04 DIAGNOSIS — Z20.828: ICD-10-CM

## 2021-01-07 ENCOUNTER — HOSPITAL ENCOUNTER (OUTPATIENT)
Dept: HOSPITAL 35 - MRI | Age: 71
Discharge: HOME | End: 2021-01-07
Payer: COMMERCIAL

## 2021-01-07 VITALS — HEIGHT: 64.02 IN | BODY MASS INDEX: 40.97 KG/M2 | WEIGHT: 240 LBS

## 2021-01-07 VITALS — SYSTOLIC BLOOD PRESSURE: 134 MMHG | DIASTOLIC BLOOD PRESSURE: 94 MMHG

## 2021-01-07 DIAGNOSIS — M48.07: ICD-10-CM

## 2021-01-07 DIAGNOSIS — M47.817: Primary | ICD-10-CM

## 2021-01-07 LAB
ANION GAP SERPL CALC-SCNC: 5 MMOL/L (ref 7–16)
BUN SERPL-MCNC: 5 MG/DL (ref 7–18)
CALCIUM SERPL-MCNC: 9.3 MG/DL (ref 8.5–10.1)
CHLORIDE SERPL-SCNC: 102 MMOL/L (ref 98–107)
CO2 SERPL-SCNC: 31 MMOL/L (ref 21–32)
CREAT SERPL-MCNC: 1.1 MG/DL (ref 0.7–1.3)
GLUCOSE SERPL-MCNC: 93 MG/DL (ref 74–106)
POTASSIUM SERPL-SCNC: 3.9 MMOL/L (ref 3.5–5.1)
SODIUM SERPL-SCNC: 138 MMOL/L (ref 136–145)

## 2021-01-07 PROCEDURE — 62900: CPT

## 2021-01-07 PROCEDURE — 62110: CPT

## 2021-01-07 PROCEDURE — 70005: CPT

## 2021-01-13 ENCOUNTER — HOSPITAL ENCOUNTER (OUTPATIENT)
Dept: HOSPITAL 35 - PAIN | Age: 71
Discharge: HOME | End: 2021-01-13
Payer: COMMERCIAL

## 2021-01-13 VITALS — DIASTOLIC BLOOD PRESSURE: 76 MMHG | SYSTOLIC BLOOD PRESSURE: 130 MMHG

## 2021-01-13 DIAGNOSIS — Z79.891: ICD-10-CM

## 2021-01-13 DIAGNOSIS — Z98.890: ICD-10-CM

## 2021-01-13 DIAGNOSIS — Z79.01: ICD-10-CM

## 2021-01-13 DIAGNOSIS — D64.9: ICD-10-CM

## 2021-01-13 DIAGNOSIS — Z96.641: ICD-10-CM

## 2021-01-13 DIAGNOSIS — Z86.711: ICD-10-CM

## 2021-01-13 DIAGNOSIS — I10: ICD-10-CM

## 2021-01-13 DIAGNOSIS — G47.33: ICD-10-CM

## 2021-01-13 DIAGNOSIS — J44.9: ICD-10-CM

## 2021-01-13 DIAGNOSIS — F17.210: ICD-10-CM

## 2021-01-13 DIAGNOSIS — Z90.49: ICD-10-CM

## 2021-01-13 DIAGNOSIS — G89.29: ICD-10-CM

## 2021-01-13 DIAGNOSIS — Z79.899: ICD-10-CM

## 2021-01-13 DIAGNOSIS — M54.16: Primary | ICD-10-CM

## 2021-01-13 NOTE — NUR
Pain Clinic Assessment:
 
1. History of Osteoarthritis:
BILAT HIPS
BACK
   History of Rheumatoid Arthritis:
NO
 
2. Height: 5 ft. 4 in. 162.6 cm.
   Weight:  lb.  oz.  kg.
   Patient's BMI:
 
3. Vital Signs:
   BP: 130/76 Pulse: 105 Resp: 22
   Temp:  02 Sat: 97 ECG Mon:
 
4. Pain Intensity: 5
 
5. Fall Risk:
   Dizziness: Y  Needs help standing or walking: Y
   Fallen in the last 3 months: N
   Fall risk comments:
FELL 2 MONTHS AGO  DID NOT SEEK MEDICAL
 
6. Patient on Blood Thinner: ELIQUIS
 
7. History of Hypertension: Y
 
8. Opioid Therapy greater than 6 weeks: Y
   Opiate Contract Signed: 10/27/17
 
9. Risk Assessment Tool Provided: LOW-0
 
10. Functional Assessment Tool: 38/70
 
11. Recreational Drug Use: Never Drug Type:
    Tobacco Use: Current Some Day Smoker Tobacco Type:
       Amount or Packs/day:  How Many Years:
    Alcohol Use: Yes  Frequency:  Quant:

## 2021-01-25 ENCOUNTER — HOSPITAL ENCOUNTER (OUTPATIENT)
Dept: HOSPITAL 35 - LAB | Age: 71
End: 2021-01-25
Attending: INTERNAL MEDICINE
Payer: COMMERCIAL

## 2021-01-25 DIAGNOSIS — R06.2: ICD-10-CM

## 2021-01-25 DIAGNOSIS — R05: ICD-10-CM

## 2021-01-25 DIAGNOSIS — Z20.822: ICD-10-CM

## 2021-01-25 DIAGNOSIS — R06.02: Primary | ICD-10-CM

## 2021-03-03 ENCOUNTER — HOSPITAL ENCOUNTER (OUTPATIENT)
Dept: HOSPITAL 35 - PAIN | Age: 71
Discharge: HOME | End: 2021-03-03
Payer: COMMERCIAL

## 2021-03-03 VITALS — HEIGHT: 64.02 IN | WEIGHT: 253.8 LBS | BODY MASS INDEX: 43.33 KG/M2

## 2021-03-03 VITALS — SYSTOLIC BLOOD PRESSURE: 151 MMHG | DIASTOLIC BLOOD PRESSURE: 80 MMHG

## 2021-03-03 DIAGNOSIS — G47.33: ICD-10-CM

## 2021-03-03 DIAGNOSIS — Z90.49: ICD-10-CM

## 2021-03-03 DIAGNOSIS — D64.9: ICD-10-CM

## 2021-03-03 DIAGNOSIS — K21.9: ICD-10-CM

## 2021-03-03 DIAGNOSIS — Z86.19: ICD-10-CM

## 2021-03-03 DIAGNOSIS — M54.16: ICD-10-CM

## 2021-03-03 DIAGNOSIS — I10: ICD-10-CM

## 2021-03-03 DIAGNOSIS — Z98.890: ICD-10-CM

## 2021-03-03 DIAGNOSIS — M25.561: Primary | ICD-10-CM

## 2021-03-03 DIAGNOSIS — G89.29: ICD-10-CM

## 2021-03-03 DIAGNOSIS — M54.12: ICD-10-CM

## 2021-03-03 DIAGNOSIS — Z96.641: ICD-10-CM

## 2021-03-03 DIAGNOSIS — Z79.899: ICD-10-CM

## 2021-03-03 DIAGNOSIS — J44.9: ICD-10-CM

## 2021-03-03 DIAGNOSIS — F17.210: ICD-10-CM

## 2021-03-03 NOTE — NUR
Pain Clinic Assessment:
 
1. History of Osteoarthritis:
BILAT HIPS
BACK
   History of Rheumatoid Arthritis:
NO
 
2. Height: 5 ft. 4 in. 162.6 cm.
   Weight: 253.8 lb.  oz. 115.123 kg.
   Patient's BMI: 43.5
 
3. Vital Signs:
   BP: 151/80 Pulse: 120 Resp: 24
   Temp:  02 Sat: 94 ECG Mon:
 
4. Pain Intensity: 4 AT REST; 7 WHEN ACTIVE
 
5. Fall Risk:
   Dizziness: N  Needs help standing or walking: Y
   Fallen in the last 3 months: N
   Fall risk comments:
FELL 2 MONTHS AGO  DID NOT SEEK MEDICAL
 
6. Patient on Blood Thinner: None
 
7. History of Hypertension: Y
 
8. Opioid Therapy greater than 6 weeks: Y
   Opiate Contract Signed: 10/27/17
 
9. Risk Assessment Tool Provided: LOW-0
 
10. Functional Assessment Tool: 38/70
 
11. Recreational Drug Use: Never Drug Type:
    Tobacco Use: Current Some Day Smoker Tobacco Type: Cigarettes
       Amount or Packs/day: 7/DAY How Many Years:
    Alcohol Use: Yes  Frequency: Daily Quant: WINE DAILY

## 2021-03-31 ENCOUNTER — HOSPITAL ENCOUNTER (OUTPATIENT)
Dept: HOSPITAL 35 - PAIN | Age: 71
End: 2021-03-31
Payer: COMMERCIAL

## 2021-03-31 VITALS — HEIGHT: 64.02 IN | WEIGHT: 254.2 LBS | BODY MASS INDEX: 43.4 KG/M2

## 2021-03-31 VITALS — DIASTOLIC BLOOD PRESSURE: 84 MMHG | SYSTOLIC BLOOD PRESSURE: 145 MMHG

## 2021-03-31 DIAGNOSIS — M54.12: ICD-10-CM

## 2021-03-31 DIAGNOSIS — F11.20: ICD-10-CM

## 2021-03-31 DIAGNOSIS — Z96.641: ICD-10-CM

## 2021-03-31 DIAGNOSIS — M79.604: Primary | ICD-10-CM

## 2021-03-31 DIAGNOSIS — J44.9: ICD-10-CM

## 2021-03-31 DIAGNOSIS — I10: ICD-10-CM

## 2021-03-31 DIAGNOSIS — M54.16: ICD-10-CM

## 2021-03-31 DIAGNOSIS — Z86.19: ICD-10-CM

## 2021-03-31 DIAGNOSIS — F17.200: ICD-10-CM

## 2021-03-31 NOTE — NUR
Pain Clinic Assessment:
 
1. History of Osteoarthritis:
BILAT HIPS
BACK
   History of Rheumatoid Arthritis:
NO
 
2. Height: 5 ft. 4 in. 162.6 cm.
   Weight: 254.2 lb.  oz. 115.305 kg.
   Patient's BMI: 43.6
 
3. Vital Signs:
   BP: 145/84 Pulse: 114 Resp: 22
   Temp:  02 Sat: 96 ECG Mon:
 
4. Pain Intensity: 4 AT REST; 7 WHEN ACTIVE
 
5. Fall Risk:
   Dizziness: N  Needs help standing or walking: Y
   Fallen in the last 3 months: Y
   Fall risk comments:
FELL 2 MONTHS AGO  DID NOT SEEK MEDICAL
 
6. Patient on Blood Thinner: None
 
7. History of Hypertension: Y
 
8. Opioid Therapy greater than 6 weeks: Y
   Opiate Contract Signed: 10/27/17
 
9. Risk Assessment Tool Provided: LOW-0
 
10. Functional Assessment Tool: 38/70
 
11. Recreational Drug Use: Never Drug Type:
    Tobacco Use: Current Some Day Smoker Tobacco Type: Cigarettes
       Amount or Packs/day: 7 - 10/DAY How Many Years:
    Alcohol Use: Yes  Frequency: Daily Quant: 1

## 2021-04-07 ENCOUNTER — HOSPITAL ENCOUNTER (OUTPATIENT)
Dept: HOSPITAL 35 - RAD | Age: 71
End: 2021-04-07
Attending: INTERNAL MEDICINE
Payer: COMMERCIAL

## 2021-04-07 DIAGNOSIS — J44.9: Primary | ICD-10-CM

## 2021-04-09 ENCOUNTER — HOSPITAL ENCOUNTER (INPATIENT)
Dept: HOSPITAL 35 - ER | Age: 71
LOS: 5 days | Discharge: HOME HEALTH | DRG: 189 | End: 2021-04-14
Attending: INTERNAL MEDICINE | Admitting: INTERNAL MEDICINE
Payer: COMMERCIAL

## 2021-04-09 VITALS — BODY MASS INDEX: 43.36 KG/M2 | HEIGHT: 64.02 IN | WEIGHT: 254 LBS

## 2021-04-09 DIAGNOSIS — M54.9: ICD-10-CM

## 2021-04-09 DIAGNOSIS — I10: ICD-10-CM

## 2021-04-09 DIAGNOSIS — G47.33: ICD-10-CM

## 2021-04-09 DIAGNOSIS — F41.9: ICD-10-CM

## 2021-04-09 DIAGNOSIS — Z86.711: ICD-10-CM

## 2021-04-09 DIAGNOSIS — J96.21: Primary | ICD-10-CM

## 2021-04-09 DIAGNOSIS — M19.90: ICD-10-CM

## 2021-04-09 DIAGNOSIS — J44.1: ICD-10-CM

## 2021-04-09 DIAGNOSIS — E66.01: ICD-10-CM

## 2021-04-09 DIAGNOSIS — R65.11: ICD-10-CM

## 2021-04-09 DIAGNOSIS — G89.29: ICD-10-CM

## 2021-04-09 DIAGNOSIS — B95.7: ICD-10-CM

## 2021-04-09 DIAGNOSIS — R78.81: ICD-10-CM

## 2021-04-09 DIAGNOSIS — Z71.6: ICD-10-CM

## 2021-04-09 DIAGNOSIS — Z90.49: ICD-10-CM

## 2021-04-09 DIAGNOSIS — K21.9: ICD-10-CM

## 2021-04-09 DIAGNOSIS — M35.1: ICD-10-CM

## 2021-04-09 DIAGNOSIS — Z20.822: ICD-10-CM

## 2021-04-09 DIAGNOSIS — M54.16: ICD-10-CM

## 2021-04-09 DIAGNOSIS — M54.12: ICD-10-CM

## 2021-04-09 DIAGNOSIS — Z96.641: ICD-10-CM

## 2021-04-09 LAB
ALBUMIN SERPL-MCNC: 3.4 G/DL (ref 3.4–5)
ALT SERPL-CCNC: 19 U/L (ref 16–63)
ANION GAP SERPL CALC-SCNC: 6 MMOL/L (ref 7–16)
APTT BLD: 25.6 SECONDS (ref 24.5–32.8)
AST SERPL-CCNC: 26 U/L (ref 15–37)
BASOPHILS NFR BLD AUTO: 0.6 % (ref 0–2)
BILIRUB SERPL-MCNC: 0.5 MG/DL (ref 0.2–1)
BUN SERPL-MCNC: 7 MG/DL (ref 7–18)
CALCIUM SERPL-MCNC: 8.9 MG/DL (ref 8.5–10.1)
CHLORIDE SERPL-SCNC: 105 MMOL/L (ref 98–107)
CO2 SERPL-SCNC: 30 MMOL/L (ref 21–32)
CREAT SERPL-MCNC: 1 MG/DL (ref 0.7–1.3)
D DIMER PPP FEU-MCNC: 0.88 UG/MLFEU (ref 0.19–0.5)
EOSINOPHIL NFR BLD: 1.7 % (ref 0–3)
ERYTHROCYTE [DISTWIDTH] IN BLOOD BY AUTOMATED COUNT: 15 % (ref 10.5–14.5)
GLUCOSE SERPL-MCNC: 91 MG/DL (ref 74–106)
GRANULOCYTES NFR BLD MANUAL: 71.1 % (ref 36–66)
HCT VFR BLD CALC: 43.7 % (ref 42–52)
HGB BLD-MCNC: 14.2 GM/DL (ref 14–18)
INR PPP: 0.98
LYMPHOCYTES NFR BLD AUTO: 14.9 % (ref 24–44)
MCH RBC QN AUTO: 32.8 PG (ref 26–34)
MCHC RBC AUTO-ENTMCNC: 32.5 G/DL (ref 28–37)
MCV RBC: 100.8 FL (ref 80–100)
MONOCYTES NFR BLD: 11.7 % (ref 1–8)
NEUTROPHILS # BLD: 4.5 THOU/UL (ref 1.4–8.2)
PLATELET # BLD: 191 THOU/UL (ref 150–400)
POTASSIUM SERPL-SCNC: 4.3 MMOL/L (ref 3.5–5.1)
PROT SERPL-MCNC: 7.5 G/DL (ref 6.4–8.2)
PROTHROMBIN TIME: 10.7 SECONDS (ref 9.3–11.4)
RBC # BLD AUTO: 4.34 MIL/UL (ref 4.5–6)
SODIUM SERPL-SCNC: 141 MMOL/L (ref 136–145)
TROPONIN I SERPL-MCNC: 0.06 NG/ML (ref ?–0.06)
WBC # BLD AUTO: 6.3 THOU/UL (ref 4–11)

## 2021-04-09 PROCEDURE — 10047: CPT

## 2021-04-09 PROCEDURE — 10045: CPT

## 2021-04-10 VITALS — SYSTOLIC BLOOD PRESSURE: 150 MMHG | DIASTOLIC BLOOD PRESSURE: 93 MMHG

## 2021-04-10 VITALS — DIASTOLIC BLOOD PRESSURE: 58 MMHG | SYSTOLIC BLOOD PRESSURE: 101 MMHG

## 2021-04-10 VITALS — SYSTOLIC BLOOD PRESSURE: 118 MMHG | DIASTOLIC BLOOD PRESSURE: 61 MMHG

## 2021-04-10 VITALS — SYSTOLIC BLOOD PRESSURE: 152 MMHG | DIASTOLIC BLOOD PRESSURE: 92 MMHG

## 2021-04-10 VITALS — SYSTOLIC BLOOD PRESSURE: 109 MMHG | DIASTOLIC BLOOD PRESSURE: 67 MMHG

## 2021-04-10 LAB
ANION GAP SERPL CALC-SCNC: 6 MMOL/L (ref 7–16)
BUN SERPL-MCNC: 8 MG/DL (ref 7–18)
CALCIUM SERPL-MCNC: 8.9 MG/DL (ref 8.5–10.1)
CHLORIDE SERPL-SCNC: 104 MMOL/L (ref 98–107)
CO2 SERPL-SCNC: 30 MMOL/L (ref 21–32)
CREAT SERPL-MCNC: 1 MG/DL (ref 0.7–1.3)
GLUCOSE SERPL-MCNC: 145 MG/DL (ref 74–106)
POTASSIUM SERPL-SCNC: 4.5 MMOL/L (ref 3.5–5.1)
SODIUM SERPL-SCNC: 140 MMOL/L (ref 136–145)
TROPONIN I SERPL-MCNC: 0.06 NG/ML (ref ?–0.06)

## 2021-04-10 PROCEDURE — 5A09357 ASSISTANCE WITH RESPIRATORY VENTILATION, LESS THAN 24 CONSECUTIVE HOURS, CONTINUOUS POSITIVE AIRWAY PRESSURE: ICD-10-PCS | Performed by: INTERNAL MEDICINE

## 2021-04-10 NOTE — NUR
Received awake on bed. Due medications given as prescribed, able to swallow
meds w/o difficulty. On room air during daytime and CPAP at HS. On telemetry;
no complains and signs of chest pain, crushing sensation and heaviness.
Assisted in ADLs. On heart healthy diet- tolerating well; no nausea, no
vomiting and no abdominal pain noted. Continent of bowel and bladder, able to
use urinal- output measured and recorded accordingly. Vital signs stable. With
SL at L AC. No complains and signs of pain noted during assessment.
To continue monitoring patient.

## 2021-04-10 NOTE — NUR
Pt. admitted to the unit from the emergency room accompanied by staff.  He is
alert and oriented.  Admission assessment and history is completed.  RT is in
the room giving patient a breathing treatment which is helpful for his copd.
Pt. c/o chronic back pain and awaiting med orders.

## 2021-04-11 VITALS — DIASTOLIC BLOOD PRESSURE: 50 MMHG | SYSTOLIC BLOOD PRESSURE: 135 MMHG

## 2021-04-11 VITALS — DIASTOLIC BLOOD PRESSURE: 78 MMHG | SYSTOLIC BLOOD PRESSURE: 138 MMHG

## 2021-04-11 VITALS — SYSTOLIC BLOOD PRESSURE: 132 MMHG | DIASTOLIC BLOOD PRESSURE: 64 MMHG

## 2021-04-11 LAB
ANION GAP SERPL CALC-SCNC: 8 MMOL/L (ref 7–16)
BUN SERPL-MCNC: 6 MG/DL (ref 7–18)
CALCIUM SERPL-MCNC: 8.9 MG/DL (ref 8.5–10.1)
CHLORIDE SERPL-SCNC: 103 MMOL/L (ref 98–107)
CO2 SERPL-SCNC: 28 MMOL/L (ref 21–32)
CREAT SERPL-MCNC: 0.9 MG/DL (ref 0.7–1.3)
GLUCOSE SERPL-MCNC: 152 MG/DL (ref 74–106)
POTASSIUM SERPL-SCNC: 3.8 MMOL/L (ref 3.5–5.1)
SODIUM SERPL-SCNC: 139 MMOL/L (ref 136–145)

## 2021-04-11 NOTE — NUR
Assumed pt care at 1900. A/OX4, VSS.LSCTA,has a congested cough. C/o a
headache,medicated with Tylenol per EMAR with some relief reported. Up
w/SBA,cane. Voiding per urinal at NOC. Blood cx results back Gram cocci
positive,Np Onelia notified,no new orders continue Abts as ordered for now.
Resting w/o any distress,CPAP in place connected to oxygen. Fall precautions
in place,will continue to monitor pt. Telemetry dc'd per orders.

## 2021-04-11 NOTE — NUR
Assumed pt care this am, VS stable on 5 lites of O2 via nc and cipap.
Preferrred to use the ciapa during the day, which gave him a head ache, relief
was noted with meducation and removal of cipap. Uses the cane and ia able to
use the toilet. POC followed, with no signs of distress. Uses the cane and a
gait belt. Endorsed to the night nurse.

## 2021-04-12 VITALS — DIASTOLIC BLOOD PRESSURE: 78 MMHG | SYSTOLIC BLOOD PRESSURE: 129 MMHG

## 2021-04-12 VITALS — DIASTOLIC BLOOD PRESSURE: 75 MMHG | SYSTOLIC BLOOD PRESSURE: 153 MMHG

## 2021-04-12 VITALS — DIASTOLIC BLOOD PRESSURE: 66 MMHG | SYSTOLIC BLOOD PRESSURE: 141 MMHG

## 2021-04-12 LAB
ANION GAP SERPL CALC-SCNC: 8 MMOL/L (ref 7–16)
BUN SERPL-MCNC: 13 MG/DL (ref 7–18)
CALCIUM SERPL-MCNC: 8.5 MG/DL (ref 8.5–10.1)
CHLORIDE SERPL-SCNC: 107 MMOL/L (ref 98–107)
CO2 SERPL-SCNC: 28 MMOL/L (ref 21–32)
CREAT SERPL-MCNC: 1.1 MG/DL (ref 0.7–1.3)
ERYTHROCYTE [DISTWIDTH] IN BLOOD BY AUTOMATED COUNT: 14.4 % (ref 10.5–14.5)
GLUCOSE SERPL-MCNC: 110 MG/DL (ref 74–106)
HCT VFR BLD CALC: 38.5 % (ref 42–52)
HGB BLD-MCNC: 12.4 GM/DL (ref 14–18)
MCH RBC QN AUTO: 32.2 PG (ref 26–34)
MCHC RBC AUTO-ENTMCNC: 32.2 G/DL (ref 28–37)
MCV RBC: 100 FL (ref 80–100)
PLATELET # BLD: 165 THOU/UL (ref 150–400)
POTASSIUM SERPL-SCNC: 3.9 MMOL/L (ref 3.5–5.1)
RBC # BLD AUTO: 3.85 MIL/UL (ref 4.5–6)
SODIUM SERPL-SCNC: 143 MMOL/L (ref 136–145)
WBC # BLD AUTO: 9.9 THOU/UL (ref 4–11)

## 2021-04-12 PROCEDURE — 5A09357 ASSISTANCE WITH RESPIRATORY VENTILATION, LESS THAN 24 CONSECUTIVE HOURS, CONTINUOUS POSITIVE AIRWAY PRESSURE: ICD-10-PCS | Performed by: INTERNAL MEDICINE

## 2021-04-12 NOTE — NUR
PT ADMITTED RELATED TO COPD EXACERBATION. CM REVIEWED CHART AND SPOKE WITH
CARE TEAM. CM MET WITH PT AT BEDSIDE THIS DAY. PT APPEARED TO BE A&O X4. CM
ROLE INTRODUCED. PT INDICATED THE LIVES IN A TOWNHOUSE WITH HIS SPOUSE WITH 5
STEPS TO ENTER AND 13 INSIDE. PT INDICATED HE HAS A CAN, FWW, CPAP, AND O2 FOR
HOME USE. PT INDICATED HE HAD BEEN ON SERVICE WITH [facility] IN
THE PAST. PT INDICATED HE PLANS TO RETURN HOME ONCE MEDICALLY STABLE. CM
FOLLOWING REGARDING DC PLANNING. CARE TEAM INDICATED POSSISBLE DC TOMORROW OR
WEDNESDAY.

## 2021-04-12 NOTE — EKG
[place] [address]
[place]  [address]
Phone:  [phone]                    ELECTROCARDIOGRAM REPORT      
_______________________________________________________________________________
 
Name:       ENEDELAI JUAREZ            Room #:         450-P       ADM IN  
M.R.#:      [id number]     Account #:      [id number]  
Admission:  04/10/21    Attend Phys:    Laurita Leroy
Discharge:              Date of Birth:  [birth year]  
                                                          Report #: [id number]
   [id number]
_______________________________________________________________________________
                         [facility] ED
                                       
Test Date:    2021               Test Time:    21:55:51
Pat Name:     ENEDELIA JUAREZ           Department:   
Patient ID:   [id number]            Room:         450
Gender:       M                        Technician:   KATHERIN
:          1950               Requested By: Ravinder Gamez
Order Number: [id number] MD:   Gurmeet López
                                 Measurements
Intervals                              Axis          
Rate:         113                      P:            43
OK:           139                      QRS:          -45
QRSD:         94                       T:            34
QT:           319                                    
QTc:          438                                    
                           Interpretive Statements
Sinus tachycardia
Low voltage, precordial leads
Baseline wander in lead(s) V2
Compared to ECG 2020 18:17:06
Low QRS voltage now present
Sinus rhythm no longer present
Electronically Signed On 2021 7:09:09 CDT by Gurmeet López
[email or web address]
 
 
 
 
 
 
 
 
 
 
 
 
 
 
 
 
 
 
 
  <ELECTRONICALLY SIGNED>
   By: Gurmeet López MD, [facility]    
  21     0709
D: 21 2155                           _____________________________________
T: 21 2155                           Gurmeet López MD, [facility]      /EPI

## 2021-04-12 NOTE — NUR
Assumed pt care this am, able to ambulate with his cane and O2 on 2liters.VS
stable. Pain is managed with medications, partial relief noted. Wife at the
bedside in the pm. POC followed with no signs or verbalizations of distress
noted. Endorsed to the night nurse.

## 2021-04-12 NOTE — NUR
ASSESSMENT COMPLETED. PT GETS UP WITH SBA TO THE BATHROOM. PT C/O HEADACHE,
ONE TIME DOSE OF TORADOL GIVEN WITH RELIEF.PT STABLE ON 02/2L/NC.NO
COUGH.CONTINUES ON IV ABTS.

## 2021-04-13 VITALS — SYSTOLIC BLOOD PRESSURE: 130 MMHG | DIASTOLIC BLOOD PRESSURE: 87 MMHG

## 2021-04-13 VITALS — SYSTOLIC BLOOD PRESSURE: 155 MMHG | DIASTOLIC BLOOD PRESSURE: 85 MMHG

## 2021-04-13 VITALS — SYSTOLIC BLOOD PRESSURE: 151 MMHG | DIASTOLIC BLOOD PRESSURE: 67 MMHG

## 2021-04-13 LAB
ANION GAP SERPL CALC-SCNC: 8 MMOL/L (ref 7–16)
BUN SERPL-MCNC: 15 MG/DL (ref 7–18)
CALCIUM SERPL-MCNC: 8.8 MG/DL (ref 8.5–10.1)
CHLORIDE SERPL-SCNC: 107 MMOL/L (ref 98–107)
CO2 SERPL-SCNC: 29 MMOL/L (ref 21–32)
CREAT SERPL-MCNC: 1 MG/DL (ref 0.7–1.3)
ERYTHROCYTE [DISTWIDTH] IN BLOOD BY AUTOMATED COUNT: 14.4 % (ref 10.5–14.5)
GLUCOSE SERPL-MCNC: 99 MG/DL (ref 74–106)
HCT VFR BLD CALC: 38.9 % (ref 42–52)
HGB BLD-MCNC: 12.6 GM/DL (ref 14–18)
MCH RBC QN AUTO: 32.6 PG (ref 26–34)
MCHC RBC AUTO-ENTMCNC: 32.5 G/DL (ref 28–37)
MCV RBC: 100.3 FL (ref 80–100)
PLATELET # BLD: 153 THOU/UL (ref 150–400)
POTASSIUM SERPL-SCNC: 4 MMOL/L (ref 3.5–5.1)
RBC # BLD AUTO: 3.88 MIL/UL (ref 4.5–6)
SODIUM SERPL-SCNC: 144 MMOL/L (ref 136–145)
WBC # BLD AUTO: 9.2 THOU/UL (ref 4–11)

## 2021-04-13 PROCEDURE — 5A09357 ASSISTANCE WITH RESPIRATORY VENTILATION, LESS THAN 24 CONSECUTIVE HOURS, CONTINUOUS POSITIVE AIRWAY PRESSURE: ICD-10-PCS | Performed by: INTERNAL MEDICINE

## 2021-04-13 NOTE — NUR
PT IS A/O X4 AND IS ON 2 LITERS NC. SOB WITH EXERTION. CPAP AT NOC. USES
URINAL AT THE BEDSIDE. UP SBA TO THE BR. NO BM THIS SHIFT. MEDICATIONS GIVEN
PER MAR. FALL PREACAUTIONS IN PLACE, CALL LIGHT IS WITHIN REACH.

## 2021-04-13 NOTE — NUR
PT COMPLAINING OF HAVING 2-4 EPISODES OF DIARRHEA. DR. CEE NOTIFIED AND PT
WAS PUT ON CDIFF PRECAUTIONS. WAITING ON STOOL SPECIMEN. PT WEANED BACK TO 2L
NC AND A DOSE OF LASIX WAS GIVEN. CONTINUE TO MONITOR.

## 2021-04-14 VITALS — SYSTOLIC BLOOD PRESSURE: 162 MMHG | DIASTOLIC BLOOD PRESSURE: 70 MMHG

## 2021-04-14 LAB
ANION GAP SERPL CALC-SCNC: 9 MMOL/L (ref 7–16)
BUN SERPL-MCNC: 18 MG/DL (ref 7–18)
CALCIUM SERPL-MCNC: 8.7 MG/DL (ref 8.5–10.1)
CHLORIDE SERPL-SCNC: 103 MMOL/L (ref 98–107)
CO2 SERPL-SCNC: 29 MMOL/L (ref 21–32)
CREAT SERPL-MCNC: 1 MG/DL (ref 0.7–1.3)
ERYTHROCYTE [DISTWIDTH] IN BLOOD BY AUTOMATED COUNT: 14 % (ref 10.5–14.5)
GLUCOSE SERPL-MCNC: 103 MG/DL (ref 74–106)
HCT VFR BLD CALC: 41.3 % (ref 42–52)
HGB BLD-MCNC: 13.2 GM/DL (ref 14–18)
MCH RBC QN AUTO: 31.5 PG (ref 26–34)
MCHC RBC AUTO-ENTMCNC: 31.9 G/DL (ref 28–37)
MCV RBC: 98.7 FL (ref 80–100)
PLATELET # BLD: 154 THOU/UL (ref 150–400)
POTASSIUM SERPL-SCNC: 3.6 MMOL/L (ref 3.5–5.1)
RBC # BLD AUTO: 4.18 MIL/UL (ref 4.5–6)
SODIUM SERPL-SCNC: 141 MMOL/L (ref 136–145)
WBC # BLD AUTO: 7.2 THOU/UL (ref 4–11)

## 2021-04-14 PROCEDURE — 5A09357 ASSISTANCE WITH RESPIRATORY VENTILATION, LESS THAN 24 CONSECUTIVE HOURS, CONTINUOUS POSITIVE AIRWAY PRESSURE: ICD-10-PCS | Performed by: INTERNAL MEDICINE

## 2021-04-14 NOTE — NUR
Assumed pt care this am, vs stable. Very anxious about O2 sats, wants to use
O2 24/7. Seen by MD and RT for rest exer oxyhmentry test. POC followed with no
signs or verbalizations of distress noted. DC instructions given to the pt,
awating wife for .

## 2021-04-14 NOTE — NUR
CARE TEAM INDICATED THAT PT IS MEDICALLY STABLE TO DISCHARGE HOME THIS DAY. RT
DID EXERCISE OX TEST AND PT DIDN'T QUALIFY FOR HOME O2. PT HAS CPAP WIHT 2L
BLEED THROUGH AT HOME ALREADY. CM MET WITH PT AT BEDSIDE THIS DAY. PT IS AWARE
AND AGREEABLE TO DC HOME THIS DAY AND WANTED TO USE [facility] AGAIN
REFERRAL AND ORDERS FAXED. CM SPOKE WITH PT REGARDING OP PULM REHAB HE
INDICATED HE HAD ATTEMPTED TO DO IT HERE IN THE PAST AND THEY TOLD HIM HE HAD
TO STOP SMOKING 2 MONTH PRIOR TO STARTING AND PT COULDN'T COMMIT TO THAT
PREVIOUSLY. PT HAS THE INFO TO CONTACT THEM AND INITIATE POSSIBLE SERVICES IN
THE FUTURE. PT HAS TRANSPORT HOME THIS DAY. NO OTHER CM INTERVENTION
INDICATED. CASE CLOSED.

## 2021-04-14 NOTE — NUR
PT CARE ASSUMED AT 1900 WITH PT SITTING AT THE BEDSIDE WATCHING TV.PT IS A/O
X4.PT IS UP WITH STANDBY ASSISTANCE TO THE BATHROOM AND USES A CANE.PT IS ON
2L OF O2 VIA NC AND ON CPAP AT NIGHT.PT C/O MUSCLE SPASM AND MEDICATION
ADMINISTERED AS PRESCRIBED.VANCOMYCIN D/C AND PT PLACED ON CEFTIAXONE.PT IS ON
CONTACT PRECAUTION FOR CDIFF DUE TO LOSS STOOL AND STILL WAITING RESULTS.WILL
CONTINUE TO MONITOR PER POC

## 2021-05-21 ENCOUNTER — HOSPITAL ENCOUNTER (OUTPATIENT)
Dept: HOSPITAL 35 - PAIN | Age: 71
End: 2021-05-21
Payer: COMMERCIAL

## 2021-05-21 VITALS — BODY MASS INDEX: 42.92 KG/M2 | WEIGHT: 251.4 LBS | HEIGHT: 64.02 IN

## 2021-05-21 VITALS — SYSTOLIC BLOOD PRESSURE: 161 MMHG | DIASTOLIC BLOOD PRESSURE: 96 MMHG

## 2021-05-21 DIAGNOSIS — J44.9: ICD-10-CM

## 2021-05-21 DIAGNOSIS — Z72.89: ICD-10-CM

## 2021-05-21 DIAGNOSIS — I10: ICD-10-CM

## 2021-05-21 DIAGNOSIS — F17.200: ICD-10-CM

## 2021-05-21 DIAGNOSIS — G47.33: ICD-10-CM

## 2021-05-21 DIAGNOSIS — M54.16: Primary | ICD-10-CM

## 2021-05-21 DIAGNOSIS — E66.01: ICD-10-CM

## 2021-05-21 DIAGNOSIS — Z79.82: ICD-10-CM

## 2021-05-21 DIAGNOSIS — Z87.891: ICD-10-CM

## 2021-05-21 DIAGNOSIS — M54.12: ICD-10-CM

## 2021-05-21 DIAGNOSIS — K21.9: ICD-10-CM

## 2021-05-21 DIAGNOSIS — Z96.641: ICD-10-CM

## 2021-05-21 DIAGNOSIS — Z79.891: ICD-10-CM

## 2021-05-21 NOTE — NUR
Pain Clinic Assessment:
 
1. History of Osteoarthritis:
BILAT HIPS
BACK
   History of Rheumatoid Arthritis:
NO
 
2. Height: 5 ft. 4 in. 162.6 cm.
   Weight: 251.4 lb.  oz. 114.035 kg.
   Patient's BMI: 43.1
 
3. Vital Signs:
   BP: 161/96 Pulse: 114 Resp: 18
   Temp:  02 Sat: 94 ECG Mon:
 
4. Pain Intensity: 4
 
5. Fall Risk:
   Dizziness: N  Needs help standing or walking: N
   Fallen in the last 3 months: N
   Fall risk comments:
FELL 2 MONTHS AGO  DID NOT SEEK MEDICAL
 
6. Patient on Blood Thinner: None
 
7. History of Hypertension: Y
 
8. Opioid Therapy greater than 6 weeks: Y
   Opiate Contract Signed: 10/27/17
 
9. Risk Assessment Tool Provided: LOW-0
 
10. Functional Assessment Tool: 38/70
 
11. Recreational Drug Use: Never Drug Type:
    Tobacco Use: Current Some Day Smoker Tobacco Type: Cigarettes
       Amount or Packs/day: 3/DAY How Many Years:
    Alcohol Use: Yes  Frequency: Daily Quant: WINE NIGHTLY

## 2021-06-18 ENCOUNTER — HOSPITAL ENCOUNTER (OUTPATIENT)
Dept: HOSPITAL 35 - PAIN | Age: 71
End: 2021-06-18
Attending: CLINICAL NURSE SPECIALIST
Payer: COMMERCIAL

## 2021-06-18 VITALS — BODY MASS INDEX: 43.06 KG/M2 | WEIGHT: 252.2 LBS | HEIGHT: 64.02 IN

## 2021-06-18 VITALS — SYSTOLIC BLOOD PRESSURE: 158 MMHG | DIASTOLIC BLOOD PRESSURE: 104 MMHG

## 2021-06-18 DIAGNOSIS — F17.209: ICD-10-CM

## 2021-06-18 DIAGNOSIS — I10: ICD-10-CM

## 2021-06-18 DIAGNOSIS — Z96.641: ICD-10-CM

## 2021-06-18 DIAGNOSIS — Z79.891: ICD-10-CM

## 2021-06-18 DIAGNOSIS — M54.16: Primary | ICD-10-CM

## 2021-06-18 DIAGNOSIS — Z79.899: ICD-10-CM

## 2021-06-18 DIAGNOSIS — J44.9: ICD-10-CM

## 2021-06-18 DIAGNOSIS — Z87.891: ICD-10-CM

## 2021-06-18 NOTE — NUR
Pain Clinic Assessment:
 
1. History of Osteoarthritis:
BILAT HIPS
BACK
   History of Rheumatoid Arthritis:
NO
 
2. Height: 5 ft. 4 in. 162.6 cm.
   Weight: 252.2 lb.  oz. 114.397 kg.
   Patient's BMI: 43.3
 
3. Vital Signs:
   BP: 158/104 Pulse: 98 Resp: 28
   Temp:  02 Sat: 94 ECG Mon:
 
4. Pain Intensity: 5
 
5. Fall Risk:
   Dizziness: N  Needs help standing or walking: N
   Fallen in the last 3 months: N
   Fall risk comments:
FELL 2 MONTHS AGO  DID NOT SEEK MEDICAL
 
6. Patient on Blood Thinner: None
 
7. History of Hypertension: Y
 
8. Opioid Therapy greater than 6 weeks: Y
   Opiate Contract Signed: 10/27/17
 
9. Risk Assessment Tool Provided: LOW-0
 
10. Functional Assessment Tool: 38/70
 
11. Recreational Drug Use: Never Drug Type:
    Tobacco Use: Current Some Day Smoker Tobacco Type: Cigarettes
       Amount or Packs/day: 2 CIGS/DAY How Many Years: 50
    Alcohol Use: Yes  Frequency: Daily Quant: 1-2 GLASSES/DAY

## 2021-07-06 ENCOUNTER — HOSPITAL ENCOUNTER (OUTPATIENT)
Dept: HOSPITAL 35 - SJCVC | Age: 71
End: 2021-07-06
Attending: INTERNAL MEDICINE
Payer: COMMERCIAL

## 2021-07-06 DIAGNOSIS — Z79.82: ICD-10-CM

## 2021-07-06 DIAGNOSIS — M06.9: ICD-10-CM

## 2021-07-06 DIAGNOSIS — E66.9: ICD-10-CM

## 2021-07-06 DIAGNOSIS — I50.32: ICD-10-CM

## 2021-07-06 DIAGNOSIS — M19.90: ICD-10-CM

## 2021-07-06 DIAGNOSIS — Z90.49: ICD-10-CM

## 2021-07-06 DIAGNOSIS — J44.1: ICD-10-CM

## 2021-07-06 DIAGNOSIS — Z99.89: ICD-10-CM

## 2021-07-06 DIAGNOSIS — I11.0: Primary | ICD-10-CM

## 2021-07-06 DIAGNOSIS — E78.5: ICD-10-CM

## 2021-07-06 DIAGNOSIS — Z86.711: ICD-10-CM

## 2021-07-06 DIAGNOSIS — R00.0: ICD-10-CM

## 2021-07-06 DIAGNOSIS — F17.210: ICD-10-CM

## 2021-07-06 DIAGNOSIS — G47.33: ICD-10-CM

## 2021-07-06 DIAGNOSIS — Z79.899: ICD-10-CM

## 2021-07-16 ENCOUNTER — HOSPITAL ENCOUNTER (OUTPATIENT)
Dept: HOSPITAL 35 - SJCVC | Age: 71
End: 2021-07-16
Attending: INTERNAL MEDICINE
Payer: COMMERCIAL

## 2021-07-16 DIAGNOSIS — I11.0: ICD-10-CM

## 2021-07-16 DIAGNOSIS — I50.32: Primary | ICD-10-CM

## 2021-07-16 DIAGNOSIS — G47.33: ICD-10-CM

## 2021-07-16 DIAGNOSIS — J44.9: ICD-10-CM

## 2021-08-01 ENCOUNTER — HOSPITAL ENCOUNTER (EMERGENCY)
Dept: HOSPITAL 35 - ER | Age: 71
Discharge: HOME | End: 2021-08-01
Payer: COMMERCIAL

## 2021-08-01 VITALS — SYSTOLIC BLOOD PRESSURE: 132 MMHG | DIASTOLIC BLOOD PRESSURE: 93 MMHG

## 2021-08-01 VITALS — BODY MASS INDEX: 42.17 KG/M2 | HEIGHT: 64 IN | WEIGHT: 247.01 LBS

## 2021-08-01 DIAGNOSIS — F17.210: ICD-10-CM

## 2021-08-01 DIAGNOSIS — K21.9: ICD-10-CM

## 2021-08-01 DIAGNOSIS — Z79.82: ICD-10-CM

## 2021-08-01 DIAGNOSIS — I10: ICD-10-CM

## 2021-08-01 DIAGNOSIS — E66.01: ICD-10-CM

## 2021-08-01 DIAGNOSIS — Z79.2: ICD-10-CM

## 2021-08-01 DIAGNOSIS — J44.1: Primary | ICD-10-CM

## 2021-08-01 DIAGNOSIS — Z20.822: ICD-10-CM

## 2021-08-01 DIAGNOSIS — Z79.899: ICD-10-CM

## 2021-08-01 LAB
ALBUMIN SERPL-MCNC: 3.5 G/DL (ref 3.4–5)
ALT SERPL-CCNC: 22 U/L (ref 30–65)
ANION GAP SERPL CALC-SCNC: 10 MMOL/L (ref 7–16)
AST SERPL-CCNC: 21 U/L (ref 15–37)
BASOPHILS NFR BLD AUTO: 0.9 % (ref 0–2)
BILIRUB SERPL-MCNC: 0.6 MG/DL (ref 0.2–1)
BUN SERPL-MCNC: 8 MG/DL (ref 7–18)
CALCIUM SERPL-MCNC: 8.8 MG/DL (ref 8.5–10.1)
CHLORIDE SERPL-SCNC: 105 MMOL/L (ref 98–107)
CO2 SERPL-SCNC: 26 MMOL/L (ref 21–32)
CREAT SERPL-MCNC: 1.1 MG/DL (ref 0.7–1.3)
EOSINOPHIL NFR BLD: 2.4 % (ref 0–3)
ERYTHROCYTE [DISTWIDTH] IN BLOOD BY AUTOMATED COUNT: 13.5 % (ref 10.5–14.5)
GLUCOSE SERPL-MCNC: 83 MG/DL (ref 74–106)
GRANULOCYTES NFR BLD MANUAL: 65.7 % (ref 36–66)
HCT VFR BLD CALC: 37.5 % (ref 42–52)
HGB BLD-MCNC: 12.3 GM/DL (ref 14–18)
LIPASE: 83 U/L (ref 73–393)
LYMPHOCYTES NFR BLD AUTO: 19.7 % (ref 24–44)
MCH RBC QN AUTO: 30.9 PG (ref 26–34)
MCHC RBC AUTO-ENTMCNC: 32.9 G/DL (ref 28–37)
MCV RBC: 93.9 FL (ref 80–100)
MONOCYTES NFR BLD: 11.3 % (ref 1–8)
NEUTROPHILS # BLD: 4 THOU/UL (ref 1.4–8.2)
PLATELET # BLD: 157 THOU/UL (ref 150–400)
POTASSIUM SERPL-SCNC: 3.9 MMOL/L (ref 3.5–5.1)
PROT SERPL-MCNC: 7.1 G/DL (ref 6.4–8.2)
RBC # BLD AUTO: 3.99 MIL/UL (ref 4.5–6)
SODIUM SERPL-SCNC: 141 MMOL/L (ref 136–145)
TROPONIN I SERPL-MCNC: <0.06 NG/ML (ref ?–0.06)
WBC # BLD AUTO: 6.1 THOU/UL (ref 4–11)

## 2021-08-01 NOTE — EMS
[address]   [address]                     EMS Patient Care Report       
_______________________________________________________________________________
 
Name:       ENEDELIA JUAREZ            Room #:                     DEP AVI PICKERING#:      [id number]                       Account #:      [id number]  
Admission:  21    Attend Phys:                          
Discharge:  21    Date of Birth:  [birth year]  
                                                          Report #: [id number]
                                                                    [id number]
_______________________________________________________________________________
THIS REPORT FOR:   //name//                          
 
Report Transmitted: 2021 15:08
EMS Care Summary
[place], [place]/[facility]
Incident [id number] @ 2021 00:26
 
Incident Location
[address] [place] [address]
 
Patient
ENEDELIA JUAREZ
Male, 70 Years
 1950
 
Patient Address
[address] [address]
 
Patient History
Chronic Obstructive Pulmonary Disease (COPD),Hypertension (HTN),Disc 
Degenerations, 
 
Patient Allergies
No known allergies,
 
Patient Medications
Hydromorphone, Furosemide, Aspirin, Protonix, Tizanidine, Potassium, Lyrica, 
Seroquel, Lexapro, 
 
Chief Complaint
copd
 
Disposition
Transported No Lights/[place]
 
Dispatch Reason
Breathing Problem
 
Transported To
[facility]
 
Narrative
Pt was found standing upright walking to the ambulance. Pt stated he had a 
history of COPD and today he didn't feel like his albuterol treatments were 
 
 
 
[address]   [address]                     EMS Patient Care Report       
_______________________________________________________________________________
 
Name:       ENEDELIA JUAREZ JR           Room #:                     DEP   
LADAN#:      [id number]                       Account #:      [id number]  
Admission:  21    Attend Phys:                          
Discharge:  21    Date of Birth:  [birth year]  
                                                          Report #: [id number]
                                                                    [id number]
_______________________________________________________________________________
lasting as long as they typically do. Pt stated an albuterol treatment will 
typically last 4-6 hours and today he is feeling like he needs a treatment more 
frequent. 
 
Initial Vitals
@00:51P: 82,R: 20,BP: 124/90,Pain: 0/10,GCS: 15,SpO2: 99,Revised Trauma: 12,
@00:38P: 99,R: 20,BP: 153/115,Pain: 0/10,GCS: 15,CO: 4,SpO2: 98,Revised Trauma: 
12, 
 
Assessments
@00:35MENTAL:Place Oriented,Time Oriented,Event Oriented,Person 
Oriented,SKIN:HEENT:LUNG SOUNDS:ABDOMEN:PELVIS//GI:EXTREMITIES:PULSE:NEURO:No 
Abnormalities,@01:26MENTAL:Time Oriented,Place Oriented,Person Oriented,Event 
Oriented,SKIN:HEENT:LUNG SOUNDS:ABDOMEN:PELVIS//GI:EXTREMITIES:PULSE:NEURO:No 
Abnormalities, 
 
Impression
Chronic Obstructive Pulmonary Disease (COPD)
 
Procedures
@00:35ALS AssessmentResponse: ImprovedSucceeded
 
Timeline
00:24,Call Received
00:24,Dispatch Notified
00:26,Dispatched
00:27,En Route
00:33,On Scene
00:35,At Patient
00:35,ALS Assessment,Response: ImprovedSucceeded,
00:38,BP: 153/115 M,PULSE: 99,RR: 20 R,SPO2: 98 Ox,ETCO2:  ,BG: ,PAIN: 0,GCS: 
15, 
00:47,Depart Scene
00:51,BP: 124/90 M,PULSE: 82,RR: 20 R,SPO2: 99 Ox,ETCO2:  ,BG: ,PAIN: 0,GCS: 15,
01:12,At Destination
01:28,Call Closed
 
Disclaimer
v1.1     Copyright  [employer]
This EMS Care Summary contains data elements from the applicable legal record 
(which may be displayed differently). It is designed to provide pertinent 
information for the following purposes: continuity of care, clinical quality, 
and state data reporting. The complete legal record is available to ED staff 
and administrators of the receiving hospital in [employer]'s Patient Tracker. All data 
is provided "as is."

## 2021-08-01 NOTE — EKG
[place]
[place]  [address]
Phone:  [phone]                    ELECTROCARDIOGRAM REPORT      
_______________________________________________________________________________
 
Name:       ENEDELIA JUAREZ            [facility]                     [id number]     Account #:      [id number]  
Admission:  21    Attend Phys:                          
Discharge:  21    Date of Birth:  [birth year]  
                                                          Report #: [id number]
   [id number]
_______________________________________________________________________________
                         [facility] ED
                                       
Test Date:    2021               Test Time:    01:36:16
Pat Name:     MCDANIELS JONES           Department:   
Patient ID:   [id number]            Room:          
Gender:       M                        Technician:   
:          1950               Requested By: Ector Gaspar
Order Number: [id number] MD:   Gurmeet López
                                 Measurements
Intervals                              Axis          
Rate:         64                       P:            43
MO:           148                      QRS:          -15
QRSD:         91                       T:            17
QT:           396                                    
QTc:          409                                    
                           Interpretive Statements
Sinus rhythm
Borderline left axis deviation
Low voltage, precordial leads
Compared to ECG 2021 21:55:51
Sinus tachycardia no longer present
Electronically Signed On 2021 9:22:53 CDT by Gurmeet López
[email or web address]
 
 
 
 
 
 
 
 
 
 
 
 
 
 
 
 
 
 
 
 
  <ELECTRONICALLY SIGNED>
   By: Gurmeet López MD, [facility]    
  21     0922
D: 21 0136                           _____________________________________
T: 21                           Gurmeet López MD, FACC      /EPI

## 2021-08-20 ENCOUNTER — HOSPITAL ENCOUNTER (OUTPATIENT)
Dept: HOSPITAL 35 - PAIN | Age: 71
End: 2021-08-20
Payer: COMMERCIAL

## 2021-08-20 VITALS — WEIGHT: 241 LBS | HEIGHT: 64.02 IN | BODY MASS INDEX: 41.15 KG/M2

## 2021-08-20 VITALS — DIASTOLIC BLOOD PRESSURE: 83 MMHG | SYSTOLIC BLOOD PRESSURE: 131 MMHG

## 2021-08-20 DIAGNOSIS — M54.12: ICD-10-CM

## 2021-08-20 DIAGNOSIS — J44.9: ICD-10-CM

## 2021-08-20 DIAGNOSIS — Z86.19: ICD-10-CM

## 2021-08-20 DIAGNOSIS — M54.16: Primary | ICD-10-CM

## 2021-08-20 DIAGNOSIS — I10: ICD-10-CM

## 2021-08-20 DIAGNOSIS — Z87.891: ICD-10-CM

## 2021-10-01 ENCOUNTER — HOSPITAL ENCOUNTER (OUTPATIENT)
Dept: HOSPITAL 35 - PAIN | Age: 71
Discharge: HOME | End: 2021-10-01
Payer: COMMERCIAL

## 2021-10-01 VITALS — SYSTOLIC BLOOD PRESSURE: 114 MMHG | DIASTOLIC BLOOD PRESSURE: 60 MMHG

## 2021-10-01 VITALS — BODY MASS INDEX: 41.35 KG/M2 | WEIGHT: 242.2 LBS | HEIGHT: 64.02 IN

## 2021-10-01 DIAGNOSIS — E66.01: ICD-10-CM

## 2021-10-01 DIAGNOSIS — K21.9: ICD-10-CM

## 2021-10-01 DIAGNOSIS — G89.29: ICD-10-CM

## 2021-10-01 DIAGNOSIS — M25.561: Primary | ICD-10-CM

## 2021-10-01 DIAGNOSIS — I10: ICD-10-CM

## 2021-10-01 DIAGNOSIS — Z96.641: ICD-10-CM

## 2021-10-01 DIAGNOSIS — Z79.899: ICD-10-CM

## 2021-10-01 DIAGNOSIS — Z98.890: ICD-10-CM

## 2021-10-01 DIAGNOSIS — J44.9: ICD-10-CM

## 2021-10-01 DIAGNOSIS — M54.16: ICD-10-CM

## 2021-10-01 DIAGNOSIS — G47.33: ICD-10-CM

## 2021-10-01 DIAGNOSIS — M54.12: ICD-10-CM

## 2021-10-01 DIAGNOSIS — Z90.49: ICD-10-CM

## 2021-10-01 NOTE — NUR
Pain Clinic Assessment:
 
1. History of Osteoarthritis:
BILAT HIPS
BACK
BILAT KNEES (WORSE ON R)
   History of Rheumatoid Arthritis:
NO
 
2. Height: 5 ft. 4 in. 162.6 cm.
   Weight: 242.2 lb.  oz. 109.861 kg.
   Patient's BMI: 41.6
 
3. Vital Signs:
   BP: 114/60 Pulse: 113 Resp: 22
   Temp:  02 Sat: 97 ECG Mon:
 
4. Pain Intensity: 3 TO 6
 
5. Fall Risk:
   Dizziness: N  Needs help standing or walking: Y
   Fallen in the last 3 months: Y
   Fall risk comments:
FELL 2 MONTHS AGO  DID NOT SEEK MEDICAL
 
6. Patient on Blood Thinner: None
 
7. History of Hypertension: Y
 
8. Opioid Therapy greater than 6 weeks: Y
   Opiate Contract Signed: 10/27/17
 
9. Risk Assessment Tool Provided: LOW-0
 
10. Functional Assessment Tool: 38/70
 
11. Recreational Drug Use: Never Drug Type:
    Tobacco Use: Current Some Day Smoker Tobacco Type: Cigarettes
       Amount or Packs/day: 7/DAY How Many Years:
    Alcohol Use: Yes  Frequency: Daily Quant: 2 WINE

## 2021-10-08 ENCOUNTER — HOSPITAL ENCOUNTER (OUTPATIENT)
Dept: HOSPITAL 35 - SJCVC | Age: 71
End: 2021-10-08
Attending: INTERNAL MEDICINE
Payer: COMMERCIAL

## 2021-10-08 DIAGNOSIS — J44.1: ICD-10-CM

## 2021-10-08 DIAGNOSIS — I11.0: ICD-10-CM

## 2021-10-08 DIAGNOSIS — Z86.711: ICD-10-CM

## 2021-10-08 DIAGNOSIS — I50.32: ICD-10-CM

## 2021-10-08 DIAGNOSIS — R00.0: Primary | ICD-10-CM

## 2021-10-08 DIAGNOSIS — G47.33: ICD-10-CM

## 2021-10-08 DIAGNOSIS — Z99.89: ICD-10-CM

## 2021-10-08 DIAGNOSIS — Z79.82: ICD-10-CM

## 2021-10-08 DIAGNOSIS — Z79.899: ICD-10-CM

## 2021-10-08 DIAGNOSIS — F17.210: ICD-10-CM

## 2021-10-08 DIAGNOSIS — E78.5: ICD-10-CM

## 2021-10-08 DIAGNOSIS — Z72.89: ICD-10-CM

## 2021-10-30 ENCOUNTER — HOSPITAL ENCOUNTER (INPATIENT)
Dept: HOSPITAL 35 - ER | Age: 71
LOS: 4 days | Discharge: HOME HEALTH | DRG: 291 | End: 2021-11-03
Attending: HOSPITALIST | Admitting: HOSPITALIST
Payer: COMMERCIAL

## 2021-10-30 VITALS — DIASTOLIC BLOOD PRESSURE: 79 MMHG | SYSTOLIC BLOOD PRESSURE: 166 MMHG

## 2021-10-30 VITALS — BODY MASS INDEX: 40.97 KG/M2 | HEIGHT: 64.02 IN | WEIGHT: 240 LBS

## 2021-10-30 DIAGNOSIS — Z86.19: ICD-10-CM

## 2021-10-30 DIAGNOSIS — Z23: ICD-10-CM

## 2021-10-30 DIAGNOSIS — I50.33: Primary | ICD-10-CM

## 2021-10-30 DIAGNOSIS — Z96.641: ICD-10-CM

## 2021-10-30 DIAGNOSIS — M54.12: ICD-10-CM

## 2021-10-30 DIAGNOSIS — J96.21: ICD-10-CM

## 2021-10-30 DIAGNOSIS — M35.1: ICD-10-CM

## 2021-10-30 DIAGNOSIS — I10: ICD-10-CM

## 2021-10-30 DIAGNOSIS — Z20.822: ICD-10-CM

## 2021-10-30 DIAGNOSIS — J45.909: ICD-10-CM

## 2021-10-30 DIAGNOSIS — M54.16: ICD-10-CM

## 2021-10-30 DIAGNOSIS — Z79.899: ICD-10-CM

## 2021-10-30 DIAGNOSIS — Z90.49: ICD-10-CM

## 2021-10-30 DIAGNOSIS — K21.9: ICD-10-CM

## 2021-10-30 DIAGNOSIS — F41.9: ICD-10-CM

## 2021-10-30 DIAGNOSIS — Z86.711: ICD-10-CM

## 2021-10-30 DIAGNOSIS — F17.210: ICD-10-CM

## 2021-10-30 DIAGNOSIS — Z88.8: ICD-10-CM

## 2021-10-30 DIAGNOSIS — M19.90: ICD-10-CM

## 2021-10-30 DIAGNOSIS — G47.33: ICD-10-CM

## 2021-10-30 DIAGNOSIS — E66.01: ICD-10-CM

## 2021-10-30 DIAGNOSIS — Z79.82: ICD-10-CM

## 2021-10-30 DIAGNOSIS — J44.1: ICD-10-CM

## 2021-10-30 DIAGNOSIS — G89.4: ICD-10-CM

## 2021-10-30 PROCEDURE — 10879: CPT

## 2021-10-30 NOTE — EMS
[address]   [address]                     EMS Patient Care Report       
_______________________________________________________________________________
 
Name:       ENEDELIA JUAREZ JR           Room #:         363-P       ADM [place]  
M.R.#:      [id number]                       Account #:      [id number]  
Admission:  10/31/21    Attend Phys:    Perry Pena MD    
Discharge:              Date of Birth:  [birth year]  
                                                          Report #: [id number]
                                                                    [id number]
_______________________________________________________________________________
THIS REPORT FOR:   //name//                          
 
Report Transmitted: 2021 14:37
EMS Care Summary
[place], [place]/[facility]
Incident [id number] @ 10/30/2021 23:03
 
Incident Location
[address] [place] [address]
 
Patient
ENEDELIA JUAREZ
Male, 70 Years
 1950
 
Patient Address
 
Patient History
Chronic Obstructive Pulmonary Disease (COPD),
 
Patient Allergies
No known allergies,
 
Patient Medications
Albuterol,
 
Chief Complaint
RESPIRATORY DISTRESS
 
Disposition
Transported No Lights/[place]
 
Dispatch Reason
Breathing Problem
 
Transported To
[facility]
 
Narrative
EMS ARRIVED ON SCENE AT THE ADDRESS ABOVE. EMS MADE PT CONTACT OUTSIDE OF 
RESIDENCE, WALKING TO AMBULANCE, AND IN VISBILE DIFFICULTLY BREATHING. PT WAS 
PLACED ON 10 LPM NRB AND SECURED WITH SEATBELTS. PT STATED THAT HE TOOK A 
BREATHING TREATMENT APPROX. 15 MINUTES PRIOR TO EMS ARRIVAL WITH NO RELIEF. PT 
HAS HX OF LUNG INFECTIONS AND COPD. AFTER BEING PLACED ON 02, PT IMPROVED IN 
CONDITION. VITAL SIGNS WERE MONITORED THROUGHOUT TRANSPORT WITH NO DECLINE IN 
 
 
 
[place] [address]                     EMS Patient Care Report       
_______________________________________________________________________________
 
Name:       ENEDELIA JUAREZ            Room #:         363-P       [facility] [place]  
[facility]#:      [id number]                       Account #:      [id number]  
Admission:  10/31/21    Attend Phys:    Perry Pena MD    
Discharge:              Date of Birth:  [birth year]  
                                                          Report #: [id number]
                                                                    [id number]
_______________________________________________________________________________
MENTAL OR PHYSICAL STATUS. TRANSFER OF CARE WAS GIVEN TO RN AT [facility]. 
 
Initial Vitals
@23:22P: 109,R: 18,BP: 128/90,Pain: 0/10,GCS: 15,CO: 5,SpO2: 100,Revised 
Trauma: 12, 
@23:13P: 105,R: 22,BP: 151/98,GCS: 15,CO: 1,SpO2: 84,Revised Trauma: 12,
@23:30P: 108,R: 18,BP: 136/94,Pain: 0/10,GCS: 15,SpO2: 96,Revised Trauma: 12,
@23:38P: 102,R: 18,BP: 140/98,Pain: 0/10,GCS: 15,SpO2: 96,Revised Trauma: 12,
 
Assessments
@23:10MENTAL:Person Oriented,Time Oriented,Place Oriented,Event 
Oriented,SKIN:HEENT:Head/Face: No Abnormalities,Neck/Airway: No 
Abnormalities,LUNG SOUNDS:ABDOMEN:PELVIS//GI:No 
Abnormalities,EXTREMITIES:Left Arm: No Abnormalities,Right Arm: No 
Abnormalities,Left Leg: No Abnormalities,Right Leg: No 
Abnormalities,PULSE:NEURO:No Abnormalities, 
 
Impression
Acute Respiratory Distress (Dyspnea)
 
Procedures
@23:10 ALS Assessment Response: UnchangedSucceeded
@23:11 Oxygen FlowRate: 10 Device: Non Re-breather Mask (NRB)  Response: 
ImprovedSucceeded 
 
 
Timeline
23:02,Call Received
23:02,Dispatch Notified
23:03,Dispatched
23:04,En Route
23:09,On Scene
23:10,At Patient
23:10,ALS Assessment,Response: UnchangedSucceeded,
23:11,Oxygen FlowRate: 10 Device: Non Re-breather Mask (NRB) Response: 
ImprovedSucceeded, 
23:13,BP: 151/98 M,PULSE: 105,RR: 22 R,SPO2: 84 Ox,ETCO2:  ,BG: ,PAIN: ,GCS: 15,
23:16,Depart Scene
23:22,BP: 128/90 M,PULSE: 109,RR: 18 R,SPO2: 100 Ox,ETCO2:  ,BG: ,PAIN: 0,GCS: 
15, 
23:30,BP: 136/94 M,PULSE: 108,RR: 18 R,SPO2: 96 Ox,ETCO2:  ,BG: ,PAIN: 0,GCS: 
15, 
23:38,BP: 140/98 M,PULSE: 102,RR: 18 R,SPO2: 96 Ox,ETCO2:  ,BG: ,PAIN: 0,GCS: 
15, 
23:39,At Destination
23:57,Call Closed
 
 
 
[address]   [address]                     EMS Patient Care Report       
_______________________________________________________________________________
 
Name:       ENEDELIA JUAREZ            Room #:         363-P       [facility] [place]  
..#:      [id number]                       Account #:      [id number]  
Admission:  10/31/21    Attend Phys:    Perry Pena MD    
Discharge:              Date of Birth:  [birth year]  
                                                          Report #: [id number]
                                                                    [id number]
_______________________________________________________________________________
 
Disclaimer
v1.1     Copyright 202 [employer]
This EMS Care Summary contains data elements from the applicable legal record 
(which may be displayed differently). It is designed to provide pertinent 
information for the following purposes: continuity of care, clinical quality, 
and state data reporting. The complete legal record is available to ED staff 
and administrators of the receiving hospital in [employer]'s Patient Tracker. All data 
is provided "as is."

## 2021-10-31 VITALS — DIASTOLIC BLOOD PRESSURE: 85 MMHG | SYSTOLIC BLOOD PRESSURE: 116 MMHG

## 2021-10-31 VITALS — DIASTOLIC BLOOD PRESSURE: 91 MMHG | SYSTOLIC BLOOD PRESSURE: 140 MMHG

## 2021-10-31 VITALS — DIASTOLIC BLOOD PRESSURE: 87 MMHG | SYSTOLIC BLOOD PRESSURE: 147 MMHG

## 2021-10-31 VITALS — DIASTOLIC BLOOD PRESSURE: 89 MMHG | SYSTOLIC BLOOD PRESSURE: 145 MMHG

## 2021-10-31 VITALS — SYSTOLIC BLOOD PRESSURE: 157 MMHG | DIASTOLIC BLOOD PRESSURE: 105 MMHG

## 2021-10-31 LAB
ANION GAP SERPL CALC-SCNC: 7 MMOL/L (ref 7–16)
BASOPHILS NFR BLD AUTO: 0.6 % (ref 0–2)
BUN SERPL-MCNC: 8 MG/DL (ref 7–18)
CALCIUM SERPL-MCNC: 8.7 MG/DL (ref 8.5–10.1)
CHLORIDE SERPL-SCNC: 104 MMOL/L (ref 98–107)
CO2 SERPL-SCNC: 31 MMOL/L (ref 21–32)
CREAT SERPL-MCNC: 1 MG/DL (ref 0.7–1.3)
EOSINOPHIL NFR BLD: 2.9 % (ref 0–3)
ERYTHROCYTE [DISTWIDTH] IN BLOOD BY AUTOMATED COUNT: 13.6 % (ref 10.5–14.5)
GLUCOSE SERPL-MCNC: 99 MG/DL (ref 74–106)
GRANULOCYTES NFR BLD MANUAL: 65.6 % (ref 36–66)
HCT VFR BLD CALC: 40.5 % (ref 42–52)
HGB BLD-MCNC: 12.9 GM/DL (ref 14–18)
LYMPHOCYTES NFR BLD AUTO: 14.9 % (ref 24–44)
MCH RBC QN AUTO: 31.4 PG (ref 26–34)
MCHC RBC AUTO-ENTMCNC: 31.9 G/DL (ref 28–37)
MCV RBC: 98.5 FL (ref 80–100)
MONOCYTES NFR BLD: 16 % (ref 1–8)
NEUTROPHILS # BLD: 3.5 THOU/UL (ref 1.4–8.2)
PLATELET # BLD: 156 THOU/UL (ref 150–400)
POTASSIUM SERPL-SCNC: 4.3 MMOL/L (ref 3.5–5.1)
RBC # BLD AUTO: 4.11 MIL/UL (ref 4.5–6)
SODIUM SERPL-SCNC: 142 MMOL/L (ref 136–145)
WBC # BLD AUTO: 5.4 THOU/UL (ref 4–11)

## 2021-10-31 PROCEDURE — 5A0935A ASSISTANCE WITH RESPIRATORY VENTILATION, LESS THAN 24 CONSECUTIVE HOURS, HIGH NASAL FLOW/VELOCITY: ICD-10-PCS | Performed by: HOSPITALIST

## 2021-11-01 VITALS — DIASTOLIC BLOOD PRESSURE: 67 MMHG | SYSTOLIC BLOOD PRESSURE: 123 MMHG

## 2021-11-01 VITALS — SYSTOLIC BLOOD PRESSURE: 148 MMHG | DIASTOLIC BLOOD PRESSURE: 96 MMHG

## 2021-11-01 VITALS — DIASTOLIC BLOOD PRESSURE: 65 MMHG | SYSTOLIC BLOOD PRESSURE: 23 MMHG

## 2021-11-01 VITALS — DIASTOLIC BLOOD PRESSURE: 88 MMHG | SYSTOLIC BLOOD PRESSURE: 151 MMHG

## 2021-11-01 VITALS — DIASTOLIC BLOOD PRESSURE: 81 MMHG | SYSTOLIC BLOOD PRESSURE: 130 MMHG

## 2021-11-01 LAB
ANION GAP SERPL CALC-SCNC: 7 MMOL/L (ref 7–16)
BUN SERPL-MCNC: 9 MG/DL (ref 7–18)
CALCIUM SERPL-MCNC: 8.7 MG/DL (ref 8.5–10.1)
CHLORIDE SERPL-SCNC: 107 MMOL/L (ref 98–107)
CO2 SERPL-SCNC: 30 MMOL/L (ref 21–32)
CREAT SERPL-MCNC: 1.2 MG/DL (ref 0.7–1.3)
ERYTHROCYTE [DISTWIDTH] IN BLOOD BY AUTOMATED COUNT: 13.5 % (ref 10.5–14.5)
GLUCOSE SERPL-MCNC: 152 MG/DL (ref 74–106)
HCT VFR BLD CALC: 37.6 % (ref 42–52)
HGB BLD-MCNC: 12 GM/DL (ref 14–18)
MCH RBC QN AUTO: 31.1 PG (ref 26–34)
MCHC RBC AUTO-ENTMCNC: 32 G/DL (ref 28–37)
MCV RBC: 97.2 FL (ref 80–100)
PLATELET # BLD: 147 THOU/UL (ref 150–400)
POTASSIUM SERPL-SCNC: 4.2 MMOL/L (ref 3.5–5.1)
RBC # BLD AUTO: 3.87 MIL/UL (ref 4.5–6)
SODIUM SERPL-SCNC: 144 MMOL/L (ref 136–145)
WBC # BLD AUTO: 6 THOU/UL (ref 4–11)

## 2021-11-01 PROCEDURE — 5A09357 ASSISTANCE WITH RESPIRATORY VENTILATION, LESS THAN 24 CONSECUTIVE HOURS, CONTINUOUS POSITIVE AIRWAY PRESSURE: ICD-10-PCS | Performed by: HOSPITALIST

## 2021-11-01 NOTE — NUR
RN ASSUMED PT'S CARE AT 0700-1900PM, PT IS A&OX4, PT IS ON O2 2L/MIN/NC AT DAY
TIME, PT IS ON BIPAP AT NIGH TIME, PT 'S VS AND O2SAT ARE STABLE AT DAY SHIFT.

## 2021-11-01 NOTE — NUR
PT WATCHING FOOTBALL. O2 PERN NC. OBESE. AMBULATES WITH CANE. PT REQUESTED
PRN FOR SLEEP AND MUSCLE RELAXER AT HS. TALKATIVE AND HAPPY.

## 2021-11-01 NOTE — NUR
progress
 
pt up ad gosia with walker voiding qs, vss. iv to left chest flushes but
painful, iv therapy consult placed. reports pain to lower back from spinal
stenosis iv morphine given x 1 with effect pt slept throughout night. cpap
applied by rt pt tolerated well. lower extremeties with 3 + edema pt states it
has subsided a little. continue poc.

## 2021-11-01 NOTE — EKG
[address] [facility]
[place]  [address]
Phone:  [phone]                    ELECTROCARDIOGRAM REPORT      
_______________________________________________________________________________
 
Name:       ENEDELIA JUAREZ            Room #:         363-P       ADM [place]  
..#:      [id number]     Account #:      [id number]  
Admission:  10/31/21    Attend Phys:    Pineda Wagner MD 
Discharge:              Date of Birth:  [birth year]  
                                                          Report #: [id number]
   [id number]
_______________________________________________________________________________
                         [facility] ED
                                       
Test Date:    2021-10-31               Test Time:    00:22:37
Pat Name:     ENEDELIA JUAREZ           Department:   
Patient ID:   [id number]            Room:         363
Gender:       M                        Technician:   cmb
:          1950               Requested By: Amadeo Mendiola
Order Number: [id number] MD:   Gurmeet López
                                 Measurements
Intervals                              Axis          
Rate:         106                      P:            79
AK:           145                      QRS:          -7
QRSD:         96                       T:            35
QT:           339                                    
QTc:          451                                    
                           Interpretive Statements
Sinus tachycardia
Multiform ventricular premature complexes
Low voltage, precordial leads
Compared to ECG 2021 01:36:16
Ventricular premature complex(es) now present
Sinus rhythm no longer present
Electronically Signed On 2021 7:27:15 CDT by Gurmeet López
[email or web address]
 
 
 
 
 
 
 
 
 
 
 
 
 
 
 
 
 
 
 
  <ELECTRONICALLY SIGNED>
   By: Gurmeet López MD, FACC    
  21     [address]27
D: 10/31/21 0022                           _____________________________________
T: 10/31/21 0022                           Gurmeet López MD, FAC      /EPI

## 2021-11-02 VITALS — DIASTOLIC BLOOD PRESSURE: 74 MMHG | SYSTOLIC BLOOD PRESSURE: 129 MMHG

## 2021-11-02 VITALS — DIASTOLIC BLOOD PRESSURE: 75 MMHG | SYSTOLIC BLOOD PRESSURE: 146 MMHG

## 2021-11-02 VITALS — SYSTOLIC BLOOD PRESSURE: 152 MMHG | DIASTOLIC BLOOD PRESSURE: 87 MMHG

## 2021-11-02 VITALS — DIASTOLIC BLOOD PRESSURE: 60 MMHG | SYSTOLIC BLOOD PRESSURE: 139 MMHG

## 2021-11-02 VITALS — DIASTOLIC BLOOD PRESSURE: 70 MMHG | SYSTOLIC BLOOD PRESSURE: 147 MMHG

## 2021-11-02 PROCEDURE — 5A09357 ASSISTANCE WITH RESPIRATORY VENTILATION, LESS THAN 24 CONSECUTIVE HOURS, CONTINUOUS POSITIVE AIRWAY PRESSURE: ICD-10-PCS | Performed by: HOSPITALIST

## 2021-11-02 NOTE — NUR
INITIAL ASSESSMENT:
SW reviewed chart and spoke with nursing and attending physician. Pt was
admitted from home due to PE. Pt is on IV lasix and IV steroids. Pt to have
echo today. SW met with pt at bedside. Introduced role of SW. Pt is
alert/orientated x 4. Pt reports he lives at home with his wife. Prior to
admission, pt was independent with ADLs. Pt does have a cane fi needed. Pt has
a home cpap machine and uses O2 with cpap at HS. Pt's cpap machine is from
[facility]. Pt has been to  and [facility] in the past. Pt has used
[facility]. Pt's PCP is Dr. Joyce. Plan is for pt to discharge home when
medically stable. SW is following to assist as needed with discharge planning.

## 2021-11-02 NOTE — NUR
RN ASSUMED PT'S CARE AT 0700-1900PM, PT IS A&OX4, PT IS ON O2 2L/MIN/NC, PT'S
VS ARE STABLE, PT DENIES SOB AT DAY SHIFT.

## 2021-11-02 NOTE — NUR
PT SITTING UP ON EDGE OF BED WATCHING TV. O2 PER NC. AMBULATES WITH CANE
STEADY. PT VERBALIZED WANTING DC TOMORROW AND FLU SHOT. CPAP AT HS. DECLINED
HS SNACK.

## 2021-11-03 VITALS — DIASTOLIC BLOOD PRESSURE: 72 MMHG | SYSTOLIC BLOOD PRESSURE: 143 MMHG

## 2021-11-03 VITALS — DIASTOLIC BLOOD PRESSURE: 64 MMHG | SYSTOLIC BLOOD PRESSURE: 144 MMHG

## 2021-11-03 VITALS — SYSTOLIC BLOOD PRESSURE: 138 MMHG | DIASTOLIC BLOOD PRESSURE: 94 MMHG

## 2021-11-03 VITALS — DIASTOLIC BLOOD PRESSURE: 76 MMHG | SYSTOLIC BLOOD PRESSURE: 146 MMHG

## 2021-11-03 VITALS — SYSTOLIC BLOOD PRESSURE: 146 MMHG | DIASTOLIC BLOOD PRESSURE: 76 MMHG

## 2021-11-03 PROCEDURE — 5A09357 ASSISTANCE WITH RESPIRATORY VENTILATION, LESS THAN 24 CONSECUTIVE HOURS, CONTINUOUS POSITIVE AIRWAY PRESSURE: ICD-10-PCS | Performed by: HOSPITALIST

## 2021-11-03 NOTE — NUR
Took over care of pt. around 0100. Slept well with CPAP on. Once he woke up ,
c/o back pain which he said he's had for years. He requested for morphine and
given to him with some relief. Voided per urinal. Denies any other concern.
Making some progress towards care plan goals.

## 2021-11-03 NOTE — NUR
TL reviewed chart and spoke with nursing and attending physician. Pt to have
echo today. Discharge home today is pending results of echo. Rest/exercise
oximetry study ordered. Pt does qualify for home O2. Pt has nocturnal O2 in
place through [facility]. TL faxed testing and script to [facility]. Left message with
[employer] intake regarding new order. Plan is for pt to discharge home when
medically stable. TL is following to assist as needed with discharge planning.

## 2021-11-03 NOTE — 2DMMODE
[facility]
[place]   [address]                   2 D/M-MODE ECHOCARDIOGRAM     
_______________________________________________________________________________
 
Name:       ENEDELIA JUAREZ            Room #:         363-P       ADM IN  
..#:      [id number]                       Account #:      [id number]  
Admission:  10/31/21    Attend Phys:    Perry Pena MD    
Discharge:              Date of Birth:  [birth year]  
                                                          Report #: [id number]
                                                                    [id number]
_______________________________________________________________________________
THIS REPORT FOR:  
 
cc:  Lester Joyce James A. DO Lundgren, Craig H. MD [facility]                                        
                                                                       ~
 
--------------- APPROVED REPORT --------------
 
 
Study performed:  2021 10:02:37
 
EXAM: Comprehensive 2D, Doppler, and color-flow 
Echocardiogram 
Patient Location: Bedside   
Room #:  363     Status:  routine
 
      BSA:         2.11
HR: 97 bpm BP:          144/64 mmHg 
Rhythm: NSR     
 
Other Information 
Study Quality: Adequate
 
Indications
COPD
Dyspnea 
 
2D Dimensions
IVSd:  10.91 (7-11mm)  LVOT Diam:  23.77 (18-24mm) 
LVDd:  46.45 mm   
PWd:  9.47 (7-11mm)  Ascending Ao:  36.54 (22-36mm)
LVDs:  34.14 (25-40mm)  
Left Atrium:  37.31 (27-40mm) 
Aortic Root:  33.79 mm  IVC:  15.00 mm
 
Aortic Valve
AoV Peak Torres.:  1.21 m/s 
AO Peak Gr.:  6.33 mmHg  LVOT Max P.21 mmHg
    LVOT Max V:  1.03 m/s
JANIYA Vmax: 3.77 cm2  
 
Mitral Valve
    E/A Ratio:  0.7
    MV Decel. Time:  263.35 ms
MV E Max Torres.:  0.61 m/s 
 
 
[facility]
[address]
[place]  [address]
Phone:  [phone] 2 D/M-MODE ECHOCARDIOGRAM     
_______________________________________________________________________________
 
Name:            ENEDELIA JUAREZ            Room #:        363-P       [facility] [place]
[facility]#:           [id number]          Account #:     [id number]  
Admission:       10/31/21         Attend Phys:   Perry Pena MD
Discharge:                  Date of Birth: [birth year]  
                         Report #:      [id number]
        [id number]
_______________________________________________________________________________
MV A Torres.:  0.88 m/s  
MV PHT:  76.37 ms  
IVRT:  110.73 ms  
 
Pulmonary Valve
PV Peak Torres.:  1.17 m/s PV Peak Gr.:  5.49 mmHg
 
Pulmonary Vein
P Vein S:    0.52 m/s P Vein A:  0.29 m/s
P Vein D:   0.46 m/s P Vein A Dur.:  129.2 msec
P Vein S/D Ratio:  1.13 
 
Left Ventricle
The left ventricle is normal size. There is normal LV segmental wall 
motion. There is normal left ventricular wall thickness. Left 
ventricular systolic function is normal. The left ventricular 
ejection fraction is within the normal range. LVEF is 60-65%. Mild 
diastolic dysfunction
 
Right Ventricle
The right ventricle is normal size. The right ventricular systolic 
function is normal.
 
Atria
The left atrium size is normal. The right atrium size is 
normal.
 
Aortic Valve
The aortic valve is normal in structure. No aortic regurgitation is 
present. There is no aortic valvular stenosis.
 
Mitral Valve
The mitral valve is normal in structure. There is no mitral valve 
regurgitation noted. No evidence of mitral valve stenosis.
 
Tricuspid Valve
The tricuspid valve is normal in structure. There is no tricuspid 
valve regurgitation noted.
 
Pulmonic Valve
The pulmonary valve is normal in structure. There is no pulmonic 
valvular regurgitation.
 
Great Vessels
The aortic root is normal in size. IVC is normal in size and 
collapses >50% with inspiration.
 
 
[facility]
[address]
[place]  [address]
Phone:  [phone]                    2 D/M-MODE ECHOCARDIOGRAM     
_______________________________________________________________________________
 
Name:            ENEDELIA JUAREZ            Room #:        363-P       [facility] [place]
.R.#:           [id number]          Account #:     [id number]  
Admission:       10/31/21         Attend Phys:   Perry Pena MD
Discharge:                  Date of Birth: [birth year]  
                         Report #:      [id number]
        [id number]
_______________________________________________________________________________
 
Pericardium
There is no pericardial effusion.
 
<Conclusion>
Left ventricular systolic function is normal.
There is normal LV segmental wall motion.
LVEF is 60-65%.
Mild diastolic dysfunction
The aortic valve is normal in structure. No aortic regurgitation or 
stenosis
The mitral valve is normal in structure. No mitral valve 
regurgitation.
Pulmonary artery pressure could not be reliably ascertained
There is no pericardial effusion.
 
 
 
 
 
 
 
 
 
 
 
 
 
 
 
 
 
 
 
 
 
 
 
 
 
 
 
 
 
  <ELECTRONICALLY SIGNED>
   By: Curt Bruno MD, [facility]   
  11/031052
D: 21                           _____________________________________
T: 21                           Curt Bruno MD, [facility]     /INF

## 2021-11-03 NOTE — NUR
RN ASSUMED PT'S CARE AT 0700-1900PM, PT IS A&OX4, PT IS ON O2 2L/MIN/NC, PT'S
VS ARE STABLE, PT DENIES SOB , RN RECEIVED ORDER TO DC PT TO HOME WITH HOME
HEALTH, PT UNDERSTANDS DC TEACHING WELL , PT'S FAMILY PICH UP PT TO HOME AT
1900PM.

## 2022-01-31 ENCOUNTER — HOSPITAL ENCOUNTER (OUTPATIENT)
Dept: HOSPITAL 35 - LAB | Age: 72
End: 2022-01-31
Attending: INTERNAL MEDICINE
Payer: COMMERCIAL

## 2022-01-31 ENCOUNTER — HOSPITAL ENCOUNTER (OUTPATIENT)
Dept: HOSPITAL 35 - SJCVC | Age: 72
End: 2022-01-31
Attending: INTERNAL MEDICINE
Payer: COMMERCIAL

## 2022-01-31 DIAGNOSIS — Z86.711: ICD-10-CM

## 2022-01-31 DIAGNOSIS — R94.31: Primary | ICD-10-CM

## 2022-01-31 DIAGNOSIS — Z86.16: ICD-10-CM

## 2022-01-31 DIAGNOSIS — Z99.89: ICD-10-CM

## 2022-01-31 DIAGNOSIS — G47.33: ICD-10-CM

## 2022-01-31 DIAGNOSIS — Z82.79: ICD-10-CM

## 2022-01-31 DIAGNOSIS — E78.5: ICD-10-CM

## 2022-01-31 DIAGNOSIS — I11.0: ICD-10-CM

## 2022-01-31 DIAGNOSIS — M19.90: ICD-10-CM

## 2022-01-31 DIAGNOSIS — I50.32: ICD-10-CM

## 2022-01-31 DIAGNOSIS — E66.9: ICD-10-CM

## 2022-01-31 DIAGNOSIS — J44.9: ICD-10-CM

## 2022-01-31 DIAGNOSIS — J45.909: ICD-10-CM

## 2022-01-31 DIAGNOSIS — Z79.899: ICD-10-CM

## 2022-01-31 DIAGNOSIS — I21.3: ICD-10-CM

## 2022-01-31 DIAGNOSIS — J44.9: Primary | ICD-10-CM

## 2022-01-31 DIAGNOSIS — F41.9: ICD-10-CM

## 2022-01-31 DIAGNOSIS — Z79.82: ICD-10-CM

## 2022-01-31 DIAGNOSIS — F17.210: ICD-10-CM

## 2022-01-31 DIAGNOSIS — M06.9: ICD-10-CM

## 2022-01-31 DIAGNOSIS — J98.4: ICD-10-CM

## 2022-01-31 DIAGNOSIS — R00.0: ICD-10-CM

## 2022-02-23 ENCOUNTER — HOSPITAL ENCOUNTER (OUTPATIENT)
Dept: HOSPITAL 35 - PAIN | Age: 72
End: 2022-02-23
Attending: CLINICAL NURSE SPECIALIST
Payer: COMMERCIAL

## 2022-02-23 VITALS — BODY MASS INDEX: 41.52 KG/M2 | WEIGHT: 243.2 LBS | HEIGHT: 64.02 IN

## 2022-02-23 VITALS — SYSTOLIC BLOOD PRESSURE: 133 MMHG | DIASTOLIC BLOOD PRESSURE: 87 MMHG

## 2022-02-23 DIAGNOSIS — Z79.899: ICD-10-CM

## 2022-02-23 DIAGNOSIS — M17.11: ICD-10-CM

## 2022-02-23 DIAGNOSIS — M54.17: Primary | ICD-10-CM

## 2022-02-23 DIAGNOSIS — G89.29: ICD-10-CM

## 2022-02-23 DIAGNOSIS — M54.12: ICD-10-CM

## 2022-02-23 DIAGNOSIS — J44.9: ICD-10-CM

## 2022-02-23 DIAGNOSIS — I10: ICD-10-CM

## 2022-02-23 NOTE — NUR
Pain Clinic Assessment:
 
1. History of Osteoarthritis:
BILAT HIPS
BACK
BILAT KNEES (WORSE ON R)
   History of Rheumatoid Arthritis:
NO
 
2. Height: 5 ft. 4 in. 162.6 cm.
   Weight: 243.2 lb.  oz. 110.315 kg.
   Patient's BMI: 41.7
 
3. Vital Signs:
   BP: 133/87 Pulse: 104 Resp: 16
   Temp:  02 Sat: 97 ECG Mon:
 
4. Pain Intensity: 6
 
5. Fall Risk:
   Dizziness: N  Needs help standing or walking: Y
   Fallen in the last 3 months: N
   Fall risk comments:
FELL 2 MONTHS AGO  DID NOT SEEK MEDICAL
 
6. Patient on Blood Thinner: None
 
7. History of Hypertension: Y
 
8. Opioid Therapy greater than 6 weeks: Y
   Opiate Contract Signed: 10/27/17
 
9. Risk Assessment Tool Provided: LOW-0
 
10. Functional Assessment Tool: 38/70
 
11. Recreational Drug Use: Never Drug Type:
    Tobacco Use: Current Some Day Smoker Tobacco Type: Cigarettes
       Amount or Packs/day: 7 /DAY How Many Years:
    Alcohol Use: Yes  Frequency: Daily Quant:

## 2022-10-06 NOTE — PLAN
[facility]
[address]
[place]   [address]                     REHAB UNIT PLAN OF CARE       
_______________________________________________________________________________
 
Name:       ENEDELIA JUAREZ JR           Room #:         510-P       [facility] [place]  
..#:      [id number]                       Account #:      [id number]  
Admission:  08/20/20    Attend Phys:    Vini Barnes MD 
Discharge:              Date of Birth:  [birth year]  
                                                          Report #: [id number]
                                                                    [id number]   
_______________________________________________________________________________
THIS REPORT FOR:   //name//                          
 
CC: Vini Joyce
 
DATE OF SERVICE:  08/22/2020
 
 
PROGRESS NOTE/OVERALL PLAN OF CARE
 
SUBJECTIVE:  The patient is seen back today in followup.  He was seen earlier in
no distress.  Temperature 36.9, pulse 106, respirations 18, blood pressure
126/74.  He is on 2 liters nasal cannula.  Staples were noted to have been
removed yesterday.  The order was given for Steri-Strips in place.  The patient
has been working in therapies with transfer standby assistance.  Gait 200 feet,
supervision front-wheeled walker.  He is min assist to go up and down 12 stairs.
 Lower body dressing standby assistance.
 
ASSESSMENT:  This is a 69-year-old male with the following problem list:
1.  Pulmonary rehabilitation.
2.  Acute hypoxic respiratory failure.
3.  Left upper lobe pulmonary embolism.
4.  Healthcare-associated pneumonia.
5.  Degenerative arthritis, status post right total hip arthroplasty on
07/28/2020, weightbearing as tolerated.
6.  Lumbar radiculopathy.
7.  Obstructive sleep apnea, on home CPAP.
8.  Hypertension.
9.  Chronic obstructive pulmonary disease.
10.  Obesity.
 
PLAN:  The overall plan of care is based on the preadmission screen,
post-admission physician evaluation and information garnered from therapy
assessments.
1.  Estimated length of stay probably going to be at least 7-14 days pending
progress.
2.  Medical prognosis is reasonably good.
3.  Anticipated interventions includes the interdisciplinary acute inpatient
rehabilitation program.
4.  Anticipated functional outcomes would be for the patient to become modified
independent with transfers, mobility and ADLs utilizing the walker.
5.  Discharge destination would be back to the home setting where he lives with
his wife.
6.  Expected therapy by discipline includes PT and OT 1-1/2 hours per day each
 
 
 
[address]   [address]                     REHAB UNIT PLAN OF CARE       
_______________________________________________________________________________
 
Name:       ENEDELIA JUAREZ            Room #:         510-P       [facility] [place]  
[facility].#:      [id number]                       Account #:      [id number]  
Admission:  08/20/20    Attend Phys:    Vini Barnes MD 
Discharge:              Date of Birth:  [birth year]  
                                                          Report #: [id number]
                                                                    [id number]   
_______________________________________________________________________________
five days a week throughout the duration of the acute inpatient rehabilitation
stay.
 
 
 
 
 
 
 
 
 
 
 
 
 
 
 
 
 
 
 
 
 
 
 
 
 
 
 
 
 
 
 
 
 
 
 
 
 
 
 
 
 
 
                         
   By:                               
                   
D: 08/22/20 1459                           _____________________________________
T: 08/22/20 2109                           Vini Barnes MD           /[facility] Partial Purse String (Simple) Text: Given the location of the defect and the characteristics of the surrounding skin a simple purse string closure was deemed most appropriate.  Undermining was performed circumfirentially around the surgical defect.  A purse string suture was then placed and tightened. Wound tension only allowed a partial closure of the circular defect.

## 2024-07-03 NOTE — NUR
07/03/24 1042   Post-Acute Status   Post-Acute Authorization Placement   Post-Acute Placement Status Referrals Sent   Patient choice form signed by patient/caregiver List with quality metrics by geographic area provided;List from CMS Compare;List from System Post-Acute Care   Discharge Delays None known at this time   Discharge Plan   Discharge Plan A Long-term acute care facility (LTAC)   Discharge Plan B Skilled Nursing Facility     I certify I provided patient choice and a list to the patient/family of CMS rated Home Health, SNF, IRF, LTACH, half-way Nursing Homes.  Patient/Family signed Patient's Choice Disclosure Form choosing the first available agency.     ASSUMED PT CARE AT AROUND 2300 HRS. PT CALLS APPROPRIATELY. HE IS SBA IN THE
ROOM WITH WALKER. USES URINAL, VOIDS ADEQUATELY. MANATINS HIS CPAP AT
[facility].ELEVATING BLE DUE TO EDEMA. AFEBRILE. NO RESP DISTRESS, MANAGING WELL ON
THE 02/2L WHILE SLEEPING.NO COUGH NOTED.